# Patient Record
Sex: MALE | Race: WHITE | Employment: UNEMPLOYED | URBAN - NONMETROPOLITAN AREA
[De-identification: names, ages, dates, MRNs, and addresses within clinical notes are randomized per-mention and may not be internally consistent; named-entity substitution may affect disease eponyms.]

---

## 2018-01-28 ENCOUNTER — HOSPITAL ENCOUNTER (EMERGENCY)
Age: 23
Discharge: HOME | End: 2018-01-28
Attending: INTERNAL MEDICINE

## 2018-01-28 ENCOUNTER — APPOINTMENT (OUTPATIENT)
Dept: GENERAL RADIOLOGY | Age: 23
End: 2018-01-28

## 2018-01-28 VITALS
RESPIRATION RATE: 18 BRPM | OXYGEN SATURATION: 98 % | SYSTOLIC BLOOD PRESSURE: 137 MMHG | TEMPERATURE: 98.4 F | DIASTOLIC BLOOD PRESSURE: 73 MMHG | HEART RATE: 105 BPM

## 2018-01-28 DIAGNOSIS — K52.9 GASTROENTERITIS: Primary | ICD-10-CM

## 2018-01-28 LAB
ADENOVIRUS F 40 41 PCR: NOT DETECTED
ALBUMIN SERPL-MCNC: 4.7 G/DL (ref 3.5–5.1)
ALP BLD-CCNC: 61 U/L (ref 38–126)
ALT SERPL-CCNC: 61 U/L (ref 11–66)
ANION GAP SERPL CALCULATED.3IONS-SCNC: 14 MEQ/L (ref 8–16)
AST SERPL-CCNC: 44 U/L (ref 5–40)
ASTROVIRUS PCR: NOT DETECTED
BASOPHILS # BLD: 0.1 %
BASOPHILS ABSOLUTE: 0 THOU/MM3 (ref 0–0.1)
BILIRUB SERPL-MCNC: 0.4 MG/DL (ref 0.3–1.2)
BILIRUBIN DIRECT: < 0.2 MG/DL (ref 0–0.3)
BUN BLDV-MCNC: 14 MG/DL (ref 7–22)
CALCIUM SERPL-MCNC: 9.4 MG/DL (ref 8.5–10.5)
CAMPYLOBACTER PCR: NOT DETECTED
CHLORIDE BLD-SCNC: 98 MEQ/L (ref 98–111)
CLOSTRIDIUM DIFFICILE, PCR: NOT DETECTED
CO2: 28 MEQ/L (ref 23–33)
CREAT SERPL-MCNC: 0.8 MG/DL (ref 0.4–1.2)
CRYPTOSPORIDIUM PCR: NOT DETECTED
CYCLOSPORA CAYETANENSIS PCR: NOT DETECTED
E COLI 0157 PCR: NORMAL
E COLI ENTEROAGGREGATIVE PCR: NOT DETECTED
E COLI ENTEROPATHOGENIC PCR: NOT DETECTED
E COLI ENTEROTOXIGENIC PCR: NOT DETECTED
E COLI SHIGA LIKE TOXIN PCR: NOT DETECTED
E COLI SHIGELLA/ENTEROINVASIVE PCR: NOT DETECTED
E HISTOLYTICA GI FILM ARRAY: NOT DETECTED
EOSINOPHIL # BLD: 0.4 %
EOSINOPHILS ABSOLUTE: 0 THOU/MM3 (ref 0–0.4)
FECAL LEUKOCYTES: NORMAL
FLU A ANTIGEN: NEGATIVE
FLU B ANTIGEN: NEGATIVE
GFR SERPL CREATININE-BSD FRML MDRD: > 90 ML/MIN/1.73M2
GIARDIA LAMBLIA PCR: NOT DETECTED
GLUCOSE BLD-MCNC: 97 MG/DL (ref 70–108)
HCT VFR BLD CALC: 48.4 % (ref 42–52)
HEMOGLOBIN: 16.7 GM/DL (ref 14–18)
LACTIC ACID: 1.2 MMOL/L (ref 0.5–2.2)
LIPASE: 25.6 U/L (ref 5.6–51.3)
LYMPHOCYTES # BLD: 17.5 %
LYMPHOCYTES ABSOLUTE: 1.9 THOU/MM3 (ref 1–4.8)
MCH RBC QN AUTO: 31 PG (ref 27–31)
MCHC RBC AUTO-ENTMCNC: 34.4 GM/DL (ref 33–37)
MCV RBC AUTO: 90.1 FL (ref 80–94)
MONOCYTES # BLD: 6.3 %
MONOCYTES ABSOLUTE: 0.7 THOU/MM3 (ref 0.4–1.3)
NOROVIRUS GI GII PCR: NOT DETECTED
NUCLEATED RED BLOOD CELLS: 0 /100 WBC
OSMOLALITY CALCULATION: 279.8 MOSMOL/KG (ref 275–300)
PDW BLD-RTO: 13 % (ref 11.5–14.5)
PLATELET # BLD: 242 THOU/MM3 (ref 130–400)
PLESIOMONAS SHIGELLOIDES PCR: NOT DETECTED
PMV BLD AUTO: 7.2 MCM (ref 7.4–10.4)
POTASSIUM SERPL-SCNC: 3.8 MEQ/L (ref 3.5–5.2)
RBC # BLD: 5.37 MILL/MM3 (ref 4.7–6.1)
ROTAVIRUS A PCR: NOT DETECTED
SALMONELLA PCR: NOT DETECTED
SAPOVIRUS PCR: NOT DETECTED
SEG NEUTROPHILS: 75.7 %
SEGMENTED NEUTROPHILS ABSOLUTE COUNT: 8.4 THOU/MM3 (ref 1.8–7.7)
SODIUM BLD-SCNC: 140 MEQ/L (ref 135–145)
TOTAL PROTEIN: 7.4 G/DL (ref 6.1–8)
VIBRIO CHOLERAE PCR: NOT DETECTED
VIBRIO PCR: NOT DETECTED
WBC # BLD: 11.1 THOU/MM3 (ref 4.8–10.8)
YERSINIA ENTEROCOLITICA PCR: NOT DETECTED

## 2018-01-28 PROCEDURE — 87804 INFLUENZA ASSAY W/OPTIC: CPT

## 2018-01-28 PROCEDURE — 87507 IADNA-DNA/RNA PROBE TQ 12-25: CPT

## 2018-01-28 PROCEDURE — 89055 LEUKOCYTE ASSESSMENT FECAL: CPT

## 2018-01-28 PROCEDURE — 83690 ASSAY OF LIPASE: CPT

## 2018-01-28 PROCEDURE — 36415 COLL VENOUS BLD VENIPUNCTURE: CPT

## 2018-01-28 PROCEDURE — 82248 BILIRUBIN DIRECT: CPT

## 2018-01-28 PROCEDURE — 85025 COMPLETE CBC W/AUTO DIFF WBC: CPT

## 2018-01-28 PROCEDURE — 6360000002 HC RX W HCPCS: Performed by: INTERNAL MEDICINE

## 2018-01-28 PROCEDURE — 99284 EMERGENCY DEPT VISIT MOD MDM: CPT

## 2018-01-28 PROCEDURE — 74022 RADEX COMPL AQT ABD SERIES: CPT

## 2018-01-28 PROCEDURE — 80053 COMPREHEN METABOLIC PANEL: CPT

## 2018-01-28 PROCEDURE — 83605 ASSAY OF LACTIC ACID: CPT

## 2018-01-28 RX ORDER — ONDANSETRON 4 MG/1
4 TABLET, ORALLY DISINTEGRATING ORAL ONCE
Status: COMPLETED | OUTPATIENT
Start: 2018-01-28 | End: 2018-01-28

## 2018-01-28 RX ORDER — 0.9 % SODIUM CHLORIDE 0.9 %
1000 INTRAVENOUS SOLUTION INTRAVENOUS ONCE
Status: DISCONTINUED | OUTPATIENT
Start: 2018-01-28 | End: 2018-01-28 | Stop reason: HOSPADM

## 2018-01-28 RX ORDER — ONDANSETRON 4 MG/1
4 TABLET, ORALLY DISINTEGRATING ORAL EVERY 8 HOURS PRN
Qty: 20 TABLET | Refills: 0 | Status: SHIPPED | OUTPATIENT
Start: 2018-01-28 | End: 2021-12-07

## 2018-01-28 RX ORDER — ONDANSETRON 2 MG/ML
4 INJECTION INTRAMUSCULAR; INTRAVENOUS ONCE
Status: DISCONTINUED | OUTPATIENT
Start: 2018-01-28 | End: 2018-01-28

## 2018-01-28 RX ADMIN — ONDANSETRON 4 MG: 4 TABLET, ORALLY DISINTEGRATING ORAL at 16:22

## 2018-01-28 ASSESSMENT — ENCOUNTER SYMPTOMS
EYE REDNESS: 0
EYE DISCHARGE: 0
ABDOMINAL PAIN: 0
WHEEZING: 0
DIARRHEA: 1
SORE THROAT: 0
BACK PAIN: 0
SHORTNESS OF BREATH: 0
NAUSEA: 1
RHINORRHEA: 0
COUGH: 1
VOMITING: 1

## 2018-01-28 ASSESSMENT — PAIN SCALES - GENERAL: PAINLEVEL_OUTOF10: 5

## 2018-01-28 ASSESSMENT — PAIN DESCRIPTION - LOCATION: LOCATION: ABDOMEN

## 2018-01-28 ASSESSMENT — PAIN DESCRIPTION - PAIN TYPE: TYPE: ACUTE PAIN

## 2018-01-28 NOTE — ED PROVIDER NOTES
[facility]  eMERGENCY dEPARTMENT eNCOUnter          CHIEF COMPLAINT       Chief Complaint   Patient presents with    Other     possiblel flu       Nurses Notes reviewed and I agree except as noted in the HPI. HISTORY OF PRESENT ILLNESS    Vanessa Guzman is a 25 y.o. male who presents to the Emergency Department for the evaluation of flu like symptoms. He reports that two days ago he started to experience abdominal discomfort, vomiting, nausea, headache, non-reproductive cough, chills, diaphoresis, diarrhea, and decreased appetite. He states to be experiencing normal fluid intake and reports that it is difficult to keep down food. He states that his current symptoms are similar to what he experienced last year when he was diagnosed with gastroenteritis. He denies being around anyone with similar symptoms with the past few days. No additional symptoms or complaints at this time. The HPI was provided by the patient. REVIEW OF SYSTEMS     Review of Systems   Constitutional: Positive for appetite change (decreased), chills and diaphoresis. Negative for fatigue and fever. HENT: Negative for congestion, ear pain, rhinorrhea and sore throat. Eyes: Negative for discharge, redness and visual disturbance. Respiratory: Positive for cough. Negative for shortness of breath and wheezing. Cardiovascular: Negative for chest pain, palpitations and leg swelling. Gastrointestinal: Positive for diarrhea, nausea and vomiting. Negative for abdominal pain (discomfort). Genitourinary: Negative for decreased urine volume, difficulty urinating and dysuria. Musculoskeletal: Negative for arthralgias, back pain, joint swelling and neck pain. Skin: Negative for pallor and rash. Allergic/Immunologic: Negative for environmental allergies. Neurological: Positive for headaches. Negative for dizziness, syncope, weakness and light-headedness. Hematological: Negative for adenopathy. Psychiatric/Behavioral: Negative for agitation, confusion, dysphoric mood and suicidal ideas. The patient is not nervous/anxious. PAST MEDICAL HISTORY    has no past medical history on file. SURGICAL HISTORY      has no past surgical history on file. CURRENT MEDICATIONS       Previous Medications    BUTALBITAL-ACETAMINOPHEN-CAFFEINE (FIORICET) -40 MG PER TABLET    Take 1 tablet by mouth every 4 hours as needed for Headaches    DICYCLOMINE (BENTYL) 10 MG CAPSULE    Take 1 capsule by mouth every 6 hours as needed (cramps)    FAMOTIDINE (PEPCID) 20 MG TABLET    Take 1 tablet by mouth 2 times daily    KETOROLAC (TORADOL) 10 MG TABLET    Take 1 tablet by mouth every 6 hours as needed for Pain    PANTOPRAZOLE (PROTONIX) 40 MG TABLET    Take 1 tablet by mouth daily       ALLERGIES     has No Known Allergies. FAMILY HISTORY     indicated that his mother is alive. He indicated that his father is alive. family history is not on file. SOCIAL HISTORY      reports that he quit smoking about 4 years ago. His smoking use included Cigarettes. He started smoking about 6 years ago. He has never used smokeless tobacco. He reports that he uses drugs, including Marijuana, about 1 time per week. He reports that he does not drink alcohol. PHYSICAL EXAM     INITIAL VITALS:  oral temperature is 98.4 °F (36.9 °C). His blood pressure is 137/73 and his pulse is 105. His respiration is 18 and oxygen saturation is 98%. Physical Exam   Constitutional: He is oriented to person, place, and time. He appears well-developed and well-nourished. HENT:   Head: Normocephalic and atraumatic. Right Ear: External ear normal.   Left Ear: External ear normal.   Mouth/Throat: Uvula is midline, oropharynx is clear and moist and mucous membranes are normal. Mucous membranes are not pale and not dry. No oral lesions. No trismus in the jaw. No dental abscesses, lacerations or dental caries.  No oropharyngeal exudate, posterior

## 2020-02-08 ENCOUNTER — HOSPITAL ENCOUNTER (EMERGENCY)
Age: 25
Discharge: HOME | End: 2020-02-08

## 2020-02-08 VITALS
SYSTOLIC BLOOD PRESSURE: 123 MMHG | RESPIRATION RATE: 18 BRPM | DIASTOLIC BLOOD PRESSURE: 64 MMHG | HEART RATE: 68 BPM | OXYGEN SATURATION: 100 % | TEMPERATURE: 98.8 F

## 2020-02-08 PROCEDURE — 99281 EMR DPT VST MAYX REQ PHY/QHP: CPT

## 2020-02-08 RX ORDER — PENICILLIN V POTASSIUM 500 MG/1
500 TABLET ORAL 4 TIMES DAILY
Qty: 28 TABLET | Refills: 0 | Status: SHIPPED | OUTPATIENT
Start: 2020-02-08 | End: 2020-02-15

## 2020-02-08 ASSESSMENT — ENCOUNTER SYMPTOMS
SINUS PAIN: 0
ABDOMINAL PAIN: 0
COUGH: 0
SHORTNESS OF BREATH: 0
SINUS PRESSURE: 0
ABDOMINAL DISTENTION: 0
NAUSEA: 0
VOMITING: 0
CONSTIPATION: 0
DIARRHEA: 0
EYE PAIN: 0
BLOOD IN STOOL: 0
SORE THROAT: 0

## 2020-02-08 ASSESSMENT — PAIN SCALES - GENERAL: PAINLEVEL_OUTOF10: 10

## 2020-02-08 ASSESSMENT — PAIN DESCRIPTION - PAIN TYPE: TYPE: ACUTE PAIN

## 2020-02-08 ASSESSMENT — PAIN DESCRIPTION - LOCATION: LOCATION: TEETH

## 2020-02-08 ASSESSMENT — PAIN DESCRIPTION - ORIENTATION: ORIENTATION: LEFT

## 2020-02-08 NOTE — ED PROVIDER NOTES
[facility]  eMERGENCY dEPARTMENT eNCOUnter          CHIEF COMPLAINT       Chief Complaint   Patient presents with    Dental Pain     left       Nurses Notes reviewed and I agree except as noted in the HPI. HISTORY OF PRESENT ILLNESS    Ambreen Ross is a 22 y.o. male who presents to the Emergency Department for the evaluation of left upper and lower dental pain. The patient reported he has had the pain for multiple months, but stated the pain has been unbearable for the last couple of days. The patient rated his pain a 10 out of 10 in severity. The patient denied recent fevers. The patient stated the pain generally responds to ibuprofen, but denied any current relief. The patient denied relief from orajel. The patient stated he has a dentist appointment on 2-10-20. The patient is a former smoker. The patient has no further acute complaints at this time. The HPI was provided by the patient. REVIEW OF SYSTEMS     Review of Systems   Constitutional: Negative for chills and fever. HENT: Positive for dental problem. Negative for congestion, ear pain, sinus pressure, sinus pain and sore throat. Eyes: Negative for pain. Respiratory: Negative for cough and shortness of breath. Cardiovascular: Negative for chest pain and leg swelling. Gastrointestinal: Negative for abdominal distention, abdominal pain, blood in stool, constipation, diarrhea, nausea and vomiting. Genitourinary: Negative for difficulty urinating, frequency and hematuria. Musculoskeletal: Negative for arthralgias. Skin: Negative for rash. Neurological: Negative for dizziness and headaches. All other systems reviewed and are negative. PAST MEDICAL HISTORY    has no past medical history on file. SURGICAL HISTORY      has no past surgical history on file.     CURRENT MEDICATIONS       Discharge Medication List as of 2/8/2020 12:51 PM      CONTINUE these medications which have NOT CHANGED    Details MG capsule Take 1 capsule by mouth every 8 hours, Disp-30 capsule, R-0Print             (Please note that portions of this note were completed with a voice recognition program.  Efforts were made to edit the dictations but occasionally words are mis-transcribed.)    The patient was given an opportunity to see the Emergency Attending. The patient voiced understanding that I was a Mid-LevelProvider and was in agreement with being seen independently by myself. Scribe:  Jazmin Tavares 2/8/20 12:51 PM Scribing for and in the presence of [employer], PA-C. Signed by: Lala Jay, 02/08/20 4:49 PM    Provider:  I personally performed the services described in the documentation, reviewed and edited the documentation which was dictated to the scribe in my presence, and it accurately records my words and actions.     Gavin Barba PA-C 2/8/20 4:49 PM      KALEIGH Lopez  02/08/20 1774

## 2020-02-08 NOTE — ED NOTES
Patient presents to the ED with complaints of having left side dental pain. He states that he has an appointment on 2-10-20 but his pain is unbearable.       Ricardo Alexander, PADMININ  70/72/25 9892

## 2020-07-28 ENCOUNTER — APPOINTMENT (OUTPATIENT)
Dept: GENERAL RADIOLOGY | Age: 25
End: 2020-07-28
Payer: MEDICAID

## 2020-07-28 ENCOUNTER — HOSPITAL ENCOUNTER (EMERGENCY)
Age: 25
Discharge: HOME | End: 2020-07-28
Attending: EMERGENCY MEDICINE
Payer: MEDICAID

## 2020-07-28 ENCOUNTER — APPOINTMENT (OUTPATIENT)
Dept: CT IMAGING | Age: 25
End: 2020-07-28
Payer: MEDICAID

## 2020-07-28 VITALS
SYSTOLIC BLOOD PRESSURE: 110 MMHG | WEIGHT: 140 LBS | TEMPERATURE: 97.7 F | RESPIRATION RATE: 16 BRPM | DIASTOLIC BLOOD PRESSURE: 54 MMHG | BODY MASS INDEX: 23.32 KG/M2 | HEIGHT: 65 IN | OXYGEN SATURATION: 98 % | HEART RATE: 75 BPM

## 2020-07-28 LAB
AMPHETAMINE+METHAMPHETAMINE URINE SCREEN: NEGATIVE
ANION GAP SERPL CALCULATED.3IONS-SCNC: 13 MEQ/L (ref 8–16)
BACTERIA: ABNORMAL /HPF
BARBITURATE QUANTITATIVE URINE: NEGATIVE
BASOPHILS # BLD: 0.1 %
BASOPHILS ABSOLUTE: 0 THOU/MM3 (ref 0–0.1)
BENZODIAZEPINE QUANTITATIVE URINE: NEGATIVE
BILIRUBIN URINE: NEGATIVE
BLOOD, URINE: NEGATIVE
BUN BLDV-MCNC: 12 MG/DL (ref 7–22)
CALCIUM SERPL-MCNC: 9 MG/DL (ref 8.5–10.5)
CANNABINOID QUANTITATIVE URINE: POSITIVE
CASTS 2: ABNORMAL /LPF
CASTS UA: ABNORMAL /LPF
CHARACTER, URINE: CLEAR
CHLORIDE BLD-SCNC: 104 MEQ/L (ref 98–111)
CO2: 21 MEQ/L (ref 23–33)
COCAINE METABOLITE QUANTITATIVE URINE: NEGATIVE
COLOR: YELLOW
CREAT SERPL-MCNC: 0.6 MG/DL (ref 0.4–1.2)
CRYPTOCOCCUS NEOFORMANS/GATTI CSF FILM ARR.: NOT DETECTED
CRYSTALS, UA: ABNORMAL
CYTOMEGALOVIRUS (CMV) CSF FILM ARRAY: NOT DETECTED
EKG ATRIAL RATE: 61 BPM
EKG P AXIS: 57 DEGREES
EKG P-R INTERVAL: 150 MS
EKG Q-T INTERVAL: 376 MS
EKG QRS DURATION: 92 MS
EKG QTC CALCULATION (BAZETT): 378 MS
EKG R AXIS: 71 DEGREES
EKG T AXIS: 60 DEGREES
EKG VENTRICULAR RATE: 61 BPM
ENTEROVIRUS DETECTION PCR: NOT DETECTED
EOSINOPHIL # BLD: 0 %
EOSINOPHILS ABSOLUTE: 0 THOU/MM3 (ref 0–0.4)
EPITHELIAL CELLS, UA: ABNORMAL /HPF
ERYTHROCYTE [DISTWIDTH] IN BLOOD BY AUTOMATED COUNT: 12.3 % (ref 11.5–14.5)
ERYTHROCYTE [DISTWIDTH] IN BLOOD BY AUTOMATED COUNT: 40.2 FL (ref 35–45)
ESCHERICHIA COLI K1 CSF FILM ARRAY: NOT DETECTED
GFR SERPL CREATININE-BSD FRML MDRD: > 90 ML/MIN/1.73M2
GLUCOSE BLD-MCNC: 135 MG/DL (ref 70–108)
GLUCOSE URINE: 100 MG/DL
GLUCOSE, CSF: 86 MG/DL (ref 40–80)
HAEMOPHILUS INFLUENZA CSF FILM ARRAY: NOT DETECTED
HCT VFR BLD CALC: 47.9 % (ref 42–52)
HEMOGLOBIN: 17.5 GM/DL (ref 14–18)
HHV-6 (HERPESVIRUS 6) CSF FILM ARRAY: NOT DETECTED
HSV-1 CSF FILM ARRAY: NOT DETECTED
HSV-2 CSF FILM ARRAY: NOT DETECTED
IMMATURE GRANS (ABS): 0.12 THOU/MM3 (ref 0–0.07)
IMMATURE GRANULOCYTES: 0.6 %
KETONES, URINE: ABNORMAL
LEUKOCYTE ESTERASE, URINE: NEGATIVE
LISTERIA MONOCYTOGENES CSF FILM ARRAY: NOT DETECTED
LYMPHOCYTES # BLD: 5.5 %
LYMPHOCYTES ABSOLUTE: 1.1 THOU/MM3 (ref 1–4.8)
MAGNESIUM: 2.3 MG/DL (ref 1.6–2.4)
MCH RBC QN AUTO: 33.1 PG (ref 26–33)
MCHC RBC AUTO-ENTMCNC: 36.5 GM/DL (ref 32.2–35.5)
MCV RBC AUTO: 90.7 FL (ref 80–94)
MISCELLANEOUS 2: ABNORMAL
MONOCYTES # BLD: 7.3 %
MONOCYTES ABSOLUTE: 1.5 THOU/MM3 (ref 0.4–1.3)
NEISSERIA MENIGITIDIS CSF FILM ARRAY: NOT DETECTED
NITRITE, URINE: NEGATIVE
NUCLEATED RED BLOOD CELLS: 0 /100 WBC
OPIATES, URINE: NEGATIVE
OSMOLALITY CALCULATION: 277.5 MOSMOL/KG (ref 275–300)
OXYCODONE: NEGATIVE
PARECHOVIRUS CSF FILM ARRAY: NOT DETECTED
PH UA: 5 (ref 5–9)
PHENCYCLIDINE QUANTITATIVE URINE: NEGATIVE
PLATELET # BLD: 181 THOU/MM3 (ref 130–400)
PMV BLD AUTO: 9.2 FL (ref 9.4–12.4)
POTASSIUM SERPL-SCNC: 3.5 MEQ/L (ref 3.5–5.2)
PROLACTIN: 10.8 NG/ML
PROTEIN CSF: 43 MG/DL (ref 12–60)
PROTEIN UA: 30
RBC # BLD: 5.28 MILL/MM3 (ref 4.7–6.1)
RBC URINE: ABNORMAL /HPF
RENAL EPITHELIAL, UA: ABNORMAL
SEG NEUTROPHILS: 86.5 %
SEGMENTED NEUTROPHILS ABSOLUTE COUNT: 17.6 THOU/MM3 (ref 1.8–7.7)
SODIUM BLD-SCNC: 138 MEQ/L (ref 135–145)
SPECIFIC GRAVITY, URINE: 1.02 (ref 1–1.03)
STREPTOCOCCUS AGALACTIAE CSF FILM ARRAY: NOT DETECTED
STREPTOCOCCUS PNEUMONIAE CSF FILM ARRAY: NOT DETECTED
TROPONIN T: < 0.01 NG/ML
UROBILINOGEN, URINE: 0.2 EU/DL (ref 0–1)
VARICELLA-ZOSTER, PCR: NOT DETECTED
WBC # BLD: 20.4 THOU/MM3 (ref 4.8–10.8)
WBC UA: ABNORMAL /HPF
YEAST: ABNORMAL

## 2020-07-28 PROCEDURE — 36415 COLL VENOUS BLD VENIPUNCTURE: CPT

## 2020-07-28 PROCEDURE — 84146 ASSAY OF PROLACTIN: CPT

## 2020-07-28 PROCEDURE — 93005 ELECTROCARDIOGRAM TRACING: CPT | Performed by: PHYSICIAN ASSISTANT

## 2020-07-28 PROCEDURE — 82945 GLUCOSE OTHER FLUID: CPT

## 2020-07-28 PROCEDURE — 87798 DETECT AGENT NOS DNA AMP: CPT

## 2020-07-28 PROCEDURE — 99284 EMERGENCY DEPT VISIT MOD MDM: CPT

## 2020-07-28 PROCEDURE — 62270 DX LMBR SPI PNXR: CPT

## 2020-07-28 PROCEDURE — 87075 CULTR BACTERIA EXCEPT BLOOD: CPT

## 2020-07-28 PROCEDURE — 84484 ASSAY OF TROPONIN QUANT: CPT

## 2020-07-28 PROCEDURE — 96375 TX/PRO/DX INJ NEW DRUG ADDON: CPT

## 2020-07-28 PROCEDURE — 84157 ASSAY OF PROTEIN OTHER: CPT

## 2020-07-28 PROCEDURE — 71045 X-RAY EXAM CHEST 1 VIEW: CPT

## 2020-07-28 PROCEDURE — 81001 URINALYSIS AUTO W/SCOPE: CPT

## 2020-07-28 PROCEDURE — 70450 CT HEAD/BRAIN W/O DYE: CPT

## 2020-07-28 PROCEDURE — 83735 ASSAY OF MAGNESIUM: CPT

## 2020-07-28 PROCEDURE — 80307 DRUG TEST PRSMV CHEM ANLYZR: CPT

## 2020-07-28 PROCEDURE — 2580000003 HC RX 258: Performed by: PHYSICIAN ASSISTANT

## 2020-07-28 PROCEDURE — 89051 BODY FLUID CELL COUNT: CPT

## 2020-07-28 PROCEDURE — 85025 COMPLETE CBC W/AUTO DIFF WBC: CPT

## 2020-07-28 PROCEDURE — 96374 THER/PROPH/DIAG INJ IV PUSH: CPT

## 2020-07-28 PROCEDURE — 6360000002 HC RX W HCPCS: Performed by: PHYSICIAN ASSISTANT

## 2020-07-28 PROCEDURE — 80048 BASIC METABOLIC PNL TOTAL CA: CPT

## 2020-07-28 PROCEDURE — 87205 SMEAR GRAM STAIN: CPT

## 2020-07-28 RX ORDER — LORAZEPAM 2 MG/ML
1 INJECTION INTRAMUSCULAR ONCE
Status: COMPLETED | OUTPATIENT
Start: 2020-07-28 | End: 2020-07-28

## 2020-07-28 RX ORDER — 0.9 % SODIUM CHLORIDE 0.9 %
1000 INTRAVENOUS SOLUTION INTRAVENOUS ONCE
Status: COMPLETED | OUTPATIENT
Start: 2020-07-28 | End: 2020-07-28

## 2020-07-28 RX ORDER — ONDANSETRON 2 MG/ML
4 INJECTION INTRAMUSCULAR; INTRAVENOUS ONCE
Status: COMPLETED | OUTPATIENT
Start: 2020-07-28 | End: 2020-07-28

## 2020-07-28 RX ORDER — LORAZEPAM 2 MG/ML
INJECTION INTRAMUSCULAR
Status: DISCONTINUED
Start: 2020-07-28 | End: 2020-07-28 | Stop reason: HOSPADM

## 2020-07-28 RX ADMIN — SODIUM CHLORIDE 1000 ML: 9 INJECTION, SOLUTION INTRAVENOUS at 12:43

## 2020-07-28 RX ADMIN — LORAZEPAM 1 MG: 2 INJECTION, SOLUTION INTRAMUSCULAR; INTRAVENOUS at 15:18

## 2020-07-28 RX ADMIN — ONDANSETRON 4 MG: 2 INJECTION INTRAMUSCULAR; INTRAVENOUS at 12:43

## 2020-07-28 ASSESSMENT — ENCOUNTER SYMPTOMS
SORE THROAT: 0
BLOOD IN STOOL: 0
COUGH: 0
CONSTIPATION: 0
SHORTNESS OF BREATH: 0
EYE PAIN: 0
DIARRHEA: 0
SINUS PAIN: 0
NAUSEA: 0
VOMITING: 0
ABDOMINAL DISTENTION: 0
ABDOMINAL PAIN: 0
SINUS PRESSURE: 0

## 2020-07-28 NOTE — ED PROVIDER NOTES
[place] [address] COMPLAINT       Chief Complaint   Patient presents with    Seizures       Nurses Notes reviewed and I agree except as notedin the HPI. HISTORY OF PRESENT ILLNESS    Sujata Miller is a 26 y.o. male who presents was of her friend's house and passed out and shook on the floor for about a minute. He states he did not have any urinary or fecal incontinence. He states he has no prior episodes of this. He states that he does smoke marijuana occasionally. He is not any starting new medications. He denies any nausea, vomiting, or diarrhea. Location/Symptom: Possible seizure/syncope  Timing/Onset: just PTA  Context/Setting: friends house  Quality: none  Duration: 1 minute  Modifying Factors: none  Severity: none    REVIEW OF SYSTEMS     Review of Systems   Constitutional: Negative for chills and fever. HENT: Negative for congestion, ear pain, sinus pressure, sinus pain and sore throat. Eyes: Negative for pain. Respiratory: Negative for cough and shortness of breath. Cardiovascular: Negative for chest pain and leg swelling. Gastrointestinal: Negative for abdominal distention, abdominal pain, blood in stool, constipation, diarrhea, nausea and vomiting. Genitourinary: Negative for difficulty urinating, frequency and hematuria. Musculoskeletal: Negative for arthralgias. Skin: Negative for rash. Neurological: Positive for seizures. Negative for dizziness and headaches. All other systems reviewed and are negative. PAST MEDICAL HISTORY    has no past medical history on file. SURGICAL HISTORY      has no past surgical history on file.     CURRENT MEDICATIONS       Discharge Medication List as of 7/28/2020  7:29 PM      CONTINUE these medications which have NOT CHANGED    Details   etodolac (LODINE) 300 MG capsule Take 1 capsule by mouth every 8 hours, Disp-30 capsule, R-0Print      ondansetron (ZOFRAN ODT) 4 MG disintegrating tablet Take 1 tablet by mouth every 8 hours as needed for Nausea, Disp-20 tablet, R-0Print      butalbital-acetaminophen-caffeine (FIORICET) -40 MG per tablet Take 1 tablet by mouth every 4 hours as needed for Headaches, Disp-30 tablet, R-0      famotidine (PEPCID) 20 MG tablet Take 1 tablet by mouth 2 times daily, Disp-60 tablet, R-2      pantoprazole (PROTONIX) 40 MG tablet Take 1 tablet by mouth daily, Disp-30 tablet, R-3      dicyclomine (BENTYL) 10 MG capsule Take 1 capsule by mouth every 6 hours as needed (cramps), Disp-60 capsule, R-0      ketorolac (TORADOL) 10 MG tablet Take 1 tablet by mouth every 6 hours as needed for Pain, Disp-20 tablet, R-0             ALLERGIES     has No Known Allergies. HISTORY     He indicated that his mother is alive. He indicated that his father is alive. family history is not on file. SOCIALHISTORY      reports that he quit smoking about 7 years ago. His smoking use included cigarettes. He started smoking about 9 years ago. He has never used smokeless tobacco. He reports current drug use. Frequency: 1.00 time per week. Drug: Marijuana. He reports that he does not drink alcohol. PHYSICAL EXAM     INITIAL VITALS:  height is 5' 5\" (1.651 m) and weight is 140 lb (63.5 kg). His oral temperature is 97.7 °F (36.5 °C). His blood pressure is 110/54 (abnormal) and his pulse is 75. His respiration is 16 and oxygen saturation is 98%. Physical Exam  Vitals signs and nursing note reviewed. Constitutional:       Comments: Well Developed Well Nourished Appearing     HENT:      Head: Normocephalic and atraumatic. Right Ear: Tympanic membrane normal.      Left Ear: Tympanic membrane normal.   Eyes:      Pupils: Pupils are equal, round, and reactive to light. Neck:      Musculoskeletal: Normal range of motion and neck supple. Cardiovascular:      Rate and Rhythm: Normal rate and regular rhythm. Heart sounds: Normal heart sounds.    Pulmonary: brain. All CT scans at this facility use dose modulation, iterative reconstruction, and/or weight-based dosing when appropriate to reduce radiation dose to as low as reasonably achievable. FINDINGS:     No acute intracranial findings. Gray-white differentiation is preserved. No acute hemorrhage or midline shift. Ventricles are within normal limits for age. Paranasal sinuses are clear. Mastoid air cells are patent. Skull: Unremarkable. Soft tissues: Unremarkable.                       XR CHEST PORTABLE (Final result)   Result time 07/28/20 12:33:50   Final result by Johana Montejo MD (07/28/20 12:33:50)                 Impression:     Normal mobile chest.         **This report has been created using voice recognition software.  It may contain minor errors which are inherent in voice recognition technology. **     Final report electronically signed by Dr. Johana Montejo on 7/28/2020 12:33 PM             Narrative:     PROCEDURE: XR CHEST PORTABLE     CLINICAL INFORMATION: passed out     COMPARISON: 9/28/2018     TECHNIQUE: A single mobile view of the chest was obtained.                    LABS:   Labs Reviewed   CBC WITH AUTO DIFFERENTIAL - Abnormal; Notable for the following components:       Result Value    WBC 20.4 (*)     MCH 33.1 (*)     MCHC 36.5 (*)     MPV 9.2 (*)     Segs Absolute 17.6 (*)     Monocytes Absolute 1.5 (*)     Immature Grans (Abs) 0.12 (*)     All other components within normal limits   BASIC METABOLIC PANEL - Abnormal; Notable for the following components:    CO2 21 (*)     Glucose 135 (*)     All other components within normal limits   URINE WITH REFLEXED MICRO - Abnormal; Notable for the following components:    Glucose, Ur 100 (*)     Ketones, Urine TRACE (*)     Protein, UA 30 (*)     All other components within normal limits   GLUCOSE, CSF - Abnormal; Notable for the following components:    Glucose, CSF 86 (*)     All other components within normal limits   CULTURE, CSF Narrative:     Source: cerebrospinal fluid       Site: 4 tubes          Current Antibiotics: not stated   MENINGITIS ENCEPHALITIS PANEL CSF, MOLECULAR   TROPONIN   MAGNESIUM   PROLACTIN   URINE DRUG SCREEN   ANION GAP   GLOMERULAR FILTRATION RATE, ESTIMATED   OSMOLALITY   PROTEIN, CSF   CSF CELL COUNT WITH DIFFERENTIAL       EMERGENCY DEPARTMENT COURSE:   :    Vitals:    07/28/20 1400 07/28/20 1621 07/28/20 1801 07/28/20 1920   BP: 123/61 104/60 121/62 (!) 110/54   Pulse: 60 66 66 75   Resp: 16 16 16 16   Temp:       TempSrc:       SpO2: 100% 100% 96% 98%   Weight:       Height:         Patient was seen history physical exam was performed. Patient given IV fluids. Patient given Zofran and Tylenol. Patient is feeling better. The patient did have some photophobia, headache, and a white count that we cannot explain so we made a decision for lumbar puncture all the etiology of the seizure. Case was signed out to my colleague Bird Kay PA-C pending encephalitis panel. See disposition below    CRITICAL CARE:  None    CONSULTS:  None    PROCEDURES:  Patient had a lumbar puncture performed by Dr. Gregoria Adams please see his procedure note. I assisted him with procedure. FINAL IMPRESSION      1.  Seizure-like activity [facility])          DISPOSITION/PLAN   Discharge    PATIENT REFERRED TO:  [address] [facility]  [address] [facility] [address] [phone]  Call in 1 day      Aby Hoff, [address] [phone]      for neurology follow-up    [facility] EMERGENCY DEPT  [address]  [address] [place] [address]  [phone]    If symptoms worsen      DISCHARGE MEDICATIONS:  Discharge Medication List as of 7/28/2020  7:29 PM          (Please note that portions of this note were completed with a voice recognitionprogram.  Efforts were made to edit the dictations but occasionally words are mis-transcribed.)    KALEIGH Vera [place], [place]  07/29/20 8146

## 2020-07-28 NOTE — ED NOTES
Patient resting quietly in cot showing no signs of distress at this time. Denies any pain. Updated on POC. Will continue to monitor. ED nurse-to-nurse bedside report    Chief Complaint   Patient presents with    Seizures      LOC: alert and orientated to name, place, date  Vital signs   Vitals:    07/28/20 1239 07/28/20 1400 07/28/20 1621 07/28/20 1801   BP:  123/61 104/60 121/62   Pulse:  60 66 66   Resp: 16 16 16 16   Temp:       TempSrc:       SpO2: 99% 100% 100% 96%   Weight:       Height:          Pain:    Pain Interventions: none  Pain Goal:   Oxygen: No    Current needs required none   Telemetry: NA  LDAs:   Peripheral IV 07/28/20 Right Wrist (Active)   Site Assessment Clean;Dry; Intact 07/28/20 1919   Line Status Normal saline locked 07/28/20 1919   Dressing Status Clean;Dry; Intact 07/28/20 1919   Dressing Intervention New 07/28/20 1217     Continuous Infusions:   Mobility: Independent  Landrum Fall Risk Score: No flowsheet data found.   Fall Interventions: call light within reach, bed rails up  Report given to: Leroy Carson RN  07/28/20 1920

## 2020-07-28 NOTE — ED NOTES
Patient presents to ED for possible seizure. States he was told by a friend that he fell over and began shaking. Patient denies any history of seizures. Denies any head injury. Denies any pain. Shows no signs of distress at this time. Skin warm and dry. Respirations easy and unlabored.       Jose G Ramirez RN  07/28/20 9907

## 2020-07-28 NOTE — ED PROVIDER NOTES
Patient was signed out to me by Florencia Melchor PA-C at end of shift pending meningitis panel. Patient is a 80-year-old gentleman who came in for concern of new onset seizure. He states that he had eaten some pancakes this morning and 10 minutes later went to his brother's house where he was going to play some video games and was noted to have seizure-like activity with generalized shaking lasting approximately 1 minute associated with eyes rolling back, unresponsiveness, tongue in the back of his throat and some gargling respirations. He was repositioned by his family and regained consciousness and began vomiting. Patient states he does not remember the shaking activity but remembers waking up on the floor and then the vomiting. He has a mild headache with mild photophobia since that time but otherwise denies any complaints. He has no prior history of seizures. He denies any medication or drug use aside from recreational marijuana which is not new for him. He does report some stress from having a new 3month-old baby at home but states he has not felt overwhelmed by this. He is sitting upright, alert, smiling, pleasant without any complaints on my examination. His work-up had revealed 20,000 white blood cell count and urinalysis positive for cannabis but was otherwise unremarkable. Meningitis panel was negative and head CT showed no acute abnormality. Prolactin was within normal limits. Discussed with attending provider. Patient and available family feel comfortable with discharge home and outpatient follow-up. As he is currently uninsured, we will provide referral to the [place] [address] and neurology referral was provided as well. Seizure precautions were discussed and the patient currently has a suspended license and is not driving.   Return precautions were discussed with the patient and family as well and they felt comfortable with the above plan, denying any further needs upon discharge.     1. Seizure-like activity ([facility].)           Renee Handy PA-C  07/28/20 [place] [address] Sera Ragland PA-C  07/28/20 1934

## 2020-07-28 NOTE — ED NOTES
Patient resting quietly in cot showing no signs of distress at this time. Friend remains at bedside. Verbalizes no needs at this time. Will continue to monitor.       Ras Calderon RN  07/28/20 6335

## 2020-07-28 NOTE — ED NOTES
Patient resting quietly in cot showing no signs of distress at this time. Friend at bedside. Complains of a headache but denies need for pain medication. Urine specimen obtained and sent to lab. Will continue to monitor.       Bettina WeavererHUAN  07/28/20 5232

## 2020-07-28 NOTE — ED NOTES
Patient resting quietly in cot showing no signs of distress at this time. Denies any pain. Friend remains at bedside. Updated on POC. Will continue to monitor.       Petra Beltrán RN  07/28/20 3663

## 2020-07-28 NOTE — ED NOTES
Patient resting quietly in cot showing no signs of distress at this time. Emesis x2. Medicated per MAR. Updated on POC. Will continue to monitor.       Jillian Sandoval RN  07/28/20 1873

## 2020-07-29 LAB
CHARACTER, CSF: CLEAR
COLOR CSF: COLORLESS
LYMPHS CSF: 62 % (ref 0–90)
MONOCYTE, CSF: 35 % (ref 0–45)
PATHOLOGIST REVIEW: NORMAL
RBC CSF: 16 /CUMM
SEGS, CSF: 3 % (ref 0–6)
TOTAL NUCLEATED CELLS CSF: 3 /CUMM (ref 0–5)
TUBE VOLUME RECEIVED CSF: 1.5 ML

## 2020-08-02 LAB
ANAEROBIC CULTURE: NORMAL
CSF CULTURE: NORMAL
GRAM STAIN RESULT: NORMAL

## 2020-08-16 ENCOUNTER — HOSPITAL ENCOUNTER (EMERGENCY)
Age: 25
Discharge: HOME | End: 2020-08-16
Payer: COMMERCIAL

## 2020-08-16 VITALS
SYSTOLIC BLOOD PRESSURE: 116 MMHG | TEMPERATURE: 97.3 F | HEART RATE: 59 BPM | WEIGHT: 140 LBS | DIASTOLIC BLOOD PRESSURE: 47 MMHG | OXYGEN SATURATION: 100 % | BODY MASS INDEX: 23.32 KG/M2 | HEIGHT: 65 IN | RESPIRATION RATE: 10 BRPM

## 2020-08-16 LAB
AMPHETAMINE+METHAMPHETAMINE URINE SCREEN: NEGATIVE
ANION GAP SERPL CALCULATED.3IONS-SCNC: 9 MEQ/L (ref 8–16)
BARBITURATE QUANTITATIVE URINE: NEGATIVE
BASOPHILS # BLD: 0.3 %
BASOPHILS ABSOLUTE: 0 THOU/MM3 (ref 0–0.1)
BENZODIAZEPINE QUANTITATIVE URINE: NEGATIVE
BUN BLDV-MCNC: 15 MG/DL (ref 7–22)
CALCIUM SERPL-MCNC: 9 MG/DL (ref 8.5–10.5)
CANNABINOID QUANTITATIVE URINE: POSITIVE
CHLORIDE BLD-SCNC: 103 MEQ/L (ref 98–111)
CO2: 25 MEQ/L (ref 23–33)
COCAINE METABOLITE QUANTITATIVE URINE: NEGATIVE
CREAT SERPL-MCNC: 0.8 MG/DL (ref 0.4–1.2)
EOSINOPHIL # BLD: 0.3 %
EOSINOPHILS ABSOLUTE: 0 THOU/MM3 (ref 0–0.4)
ERYTHROCYTE [DISTWIDTH] IN BLOOD BY AUTOMATED COUNT: 11.9 % (ref 11.5–14.5)
ERYTHROCYTE [DISTWIDTH] IN BLOOD BY AUTOMATED COUNT: 39.6 FL (ref 35–45)
GFR SERPL CREATININE-BSD FRML MDRD: > 90 ML/MIN/1.73M2
GLUCOSE BLD-MCNC: 127 MG/DL (ref 70–108)
HCT VFR BLD CALC: 47.1 % (ref 42–52)
HEMOGLOBIN: 16.8 GM/DL (ref 14–18)
IMMATURE GRANS (ABS): 0.03 THOU/MM3 (ref 0–0.07)
IMMATURE GRANULOCYTES: 0.3 %
LYMPHOCYTES # BLD: 14.8 %
LYMPHOCYTES ABSOLUTE: 1.4 THOU/MM3 (ref 1–4.8)
MAGNESIUM: 2.2 MG/DL (ref 1.6–2.4)
MCH RBC QN AUTO: 32.4 PG (ref 26–33)
MCHC RBC AUTO-ENTMCNC: 35.7 GM/DL (ref 32.2–35.5)
MCV RBC AUTO: 90.9 FL (ref 80–94)
MONOCYTES # BLD: 6.4 %
MONOCYTES ABSOLUTE: 0.6 THOU/MM3 (ref 0.4–1.3)
NUCLEATED RED BLOOD CELLS: 0 /100 WBC
OPIATES, URINE: NEGATIVE
OSMOLALITY CALCULATION: 276.2 MOSMOL/KG (ref 275–300)
OXYCODONE: NEGATIVE
PHENCYCLIDINE QUANTITATIVE URINE: NEGATIVE
PLATELET # BLD: 196 THOU/MM3 (ref 130–400)
PMV BLD AUTO: 9 FL (ref 9.4–12.4)
POTASSIUM SERPL-SCNC: 3.7 MEQ/L (ref 3.5–5.2)
RBC # BLD: 5.18 MILL/MM3 (ref 4.7–6.1)
SEG NEUTROPHILS: 77.9 %
SEGMENTED NEUTROPHILS ABSOLUTE COUNT: 7.4 THOU/MM3 (ref 1.8–7.7)
SODIUM BLD-SCNC: 137 MEQ/L (ref 135–145)
WBC # BLD: 9.5 THOU/MM3 (ref 4.8–10.8)

## 2020-08-16 PROCEDURE — 99284 EMERGENCY DEPT VISIT MOD MDM: CPT

## 2020-08-16 PROCEDURE — 36415 COLL VENOUS BLD VENIPUNCTURE: CPT

## 2020-08-16 PROCEDURE — 80048 BASIC METABOLIC PNL TOTAL CA: CPT

## 2020-08-16 PROCEDURE — 99283 EMERGENCY DEPT VISIT LOW MDM: CPT

## 2020-08-16 PROCEDURE — 99282 EMERGENCY DEPT VISIT SF MDM: CPT

## 2020-08-16 PROCEDURE — 85025 COMPLETE CBC W/AUTO DIFF WBC: CPT

## 2020-08-16 PROCEDURE — 83735 ASSAY OF MAGNESIUM: CPT

## 2020-08-16 PROCEDURE — 80307 DRUG TEST PRSMV CHEM ANLYZR: CPT

## 2020-08-16 RX ORDER — SODIUM CHLORIDE 9 MG/ML
INJECTION, SOLUTION INTRAVENOUS CONTINUOUS
Status: DISCONTINUED | OUTPATIENT
Start: 2020-08-16 | End: 2020-08-16 | Stop reason: HOSPADM

## 2020-08-16 ASSESSMENT — ENCOUNTER SYMPTOMS
EYE PAIN: 0
ABDOMINAL PAIN: 0
DIARRHEA: 0
SINUS PRESSURE: 0
SINUS PAIN: 0
VOMITING: 0
ABDOMINAL DISTENTION: 0
SHORTNESS OF BREATH: 0
COUGH: 0
CONSTIPATION: 0
BLOOD IN STOOL: 0
NAUSEA: 0
SORE THROAT: 0

## 2020-08-16 NOTE — ED PROVIDER NOTES
[facility] 65 22 COMPLAINT       Chief Complaint   Patient presents with    Seizures       Nurses Notes reviewed and I agree except as notedin the HPI. HISTORY OF PRESENT ILLNESS    Antwan Zambrano is a 26 y.o. male who presents was brought in after a seizure occurred at home. Is unknown how long he was seizing. The patient had a seizure prior to this was referred to neurology however never went. He states he was not sure if his Medicaid kicked in or not. his friend that was with him states he was postictal after he was notified of the seizure. No recent illnesses. REVIEW OF SYSTEMS     Review of Systems   Constitutional: Negative for chills and fever. HENT: Negative for congestion, ear pain, sinus pressure, sinus pain and sore throat. Eyes: Negative for pain. Respiratory: Negative for cough and shortness of breath. Cardiovascular: Negative for chest pain and leg swelling. Gastrointestinal: Negative for abdominal distention, abdominal pain, blood in stool, constipation, diarrhea, nausea and vomiting. Genitourinary: Negative for difficulty urinating, frequency and hematuria. Musculoskeletal: Negative for arthralgias. Skin: Negative for rash. Neurological: Positive for seizures. Negative for dizziness and headaches. All other systems reviewed and are negative. PAST MEDICAL HISTORY    has no past medical history on file. SURGICAL HISTORY      has no past surgical history on file.     CURRENT MEDICATIONS       Discharge Medication List as of 8/16/2020  2:28 PM      CONTINUE these medications which have NOT CHANGED    Details   etodolac (LODINE) 300 MG capsule Take 1 capsule by mouth every 8 hours, Disp-30 capsule, R-0Print      ondansetron (ZOFRAN ODT) 4 MG disintegrating tablet Take 1 tablet by mouth every 8 hours as needed for Nausea, Disp-20 tablet, R-0Print      butalbital-acetaminophen-caffeine (FIORICET) -40 MG per tablet Take 1 tablet by mouth every 4 hours as needed for Headaches, Disp-30 tablet, R-0      famotidine (PEPCID) 20 MG tablet Take 1 tablet by mouth 2 times daily, Disp-60 tablet, R-2      pantoprazole (PROTONIX) 40 MG tablet Take 1 tablet by mouth daily, Disp-30 tablet, R-3      dicyclomine (BENTYL) 10 MG capsule Take 1 capsule by mouth every 6 hours as needed (cramps), Disp-60 capsule, R-0      ketorolac (TORADOL) 10 MG tablet Take 1 tablet by mouth every 6 hours as needed for Pain, Disp-20 tablet, R-0             ALLERGIES     has No Known Allergies. HISTORY     He indicated that his mother is alive. He indicated that his father is alive. family history is not on file. SOCIALHISTORY      reports that he quit smoking about 7 years ago. His smoking use included cigarettes. He started smoking about 9 years ago. He has never used smokeless tobacco. He reports current drug use. Frequency: 1.00 time per week. Drug: Marijuana. He reports that he does not drink alcohol. PHYSICAL EXAM     INITIAL VITALS:  height is 5' 5\" (1.651 m) and weight is 140 lb (63.5 kg). His oral temperature is 97.3 °F (36.3 °C). His blood pressure is 116/47 (abnormal) and his pulse is 59. His respiration is 10 and oxygen saturation is 100%. Physical Exam  Vitals signs and nursing note reviewed. Constitutional:       Appearance: He is well-developed. HENT:      Head: Normocephalic and atraumatic. Right Ear: Tympanic membrane and external ear normal.      Left Ear: Tympanic membrane and external ear normal.   Eyes:      Pupils: Pupils are equal, round, and reactive to light. Neck:      Musculoskeletal: Normal range of motion. Cardiovascular:      Rate and Rhythm: Normal rate and regular rhythm. Pulmonary:      Effort: Pulmonary effort is normal.      Breath sounds: Normal breath sounds. No wheezing. Abdominal:      General: Bowel sounds are normal. There is no distension.       Palpations: Abdomen is soft.      Tenderness: There is no abdominal tenderness. Skin:     General: Skin is warm. Neurological:      Mental Status: He is alert and oriented to person, place, and time. Cranial Nerves: No cranial nerve deficit. Coordination: Coordination normal.      Deep Tendon Reflexes: Reflexes normal.   Psychiatric:         Behavior: Behavior normal.         DIFFERENTIAL DIAGNOSIS:   Possible breakthrough seizure. Will get electrolyte panel and urine drug screen to screen for possible etiology. DIAGNOSTIC RESULTS     EKG: All EKG's are interpreted by the Emergency Department Physician who either signs or Co-signs this chart in the absence of a cardiologist.      RADIOLOGY: non-plain film images(s) such as CT, Ultrasound and MRI are read by the radiologist.  None      LABS:   Labs Reviewed   CBC WITH AUTO DIFFERENTIAL - Abnormal; Notable for the following components:       Result Value    MCHC 35.7 (*)     MPV 9.0 (*)     All other components within normal limits   BASIC METABOLIC PANEL - Abnormal; Notable for the following components:    Glucose 127 (*)     All other components within normal limits   MAGNESIUM   URINE DRUG SCREEN   ANION GAP   GLOMERULAR FILTRATION RATE, ESTIMATED   OSMOLALITY       EMERGENCY DEPARTMENT COURSE:   :    Vitals:    08/16/20 1159   BP: (!) 116/47   Pulse: 59   Resp: 10   Temp: 97.3 °F (36.3 °C)   TempSrc: Oral   SpO2: 100%   Weight: 140 lb (63.5 kg)   Height: 5' 5\" (1.651 m)     Patient was seen history physical exam was performed. Patient had no further seizure activity. Will discharge home. See disposition below    CRITICAL CARE:  None    CONSULTS:  None    PROCEDURES:  None    FINAL IMPRESSION      1. Seizure ([address]    2.  Marijuana abuse          DISPOSITION/PLAN   Discharge    PATIENT REFERRED TO:  Kayden Verdin MD  [address] [place] [address],Third Floor [phone]            DISCHARGE MEDICATIONS:  Discharge Medication List as of 8/16/2020  2:28 PM (Please note that portions of this note were completed with a voice recognitionprogram.  Efforts were made to edit the dictations but occasionally words are mis-transcribed.)    KALEIGH Rocha [place], [place]  08/16/20 7578

## 2021-05-02 ENCOUNTER — HOSPITAL ENCOUNTER (EMERGENCY)
Age: 26
Discharge: HOME | End: 2021-05-02
Payer: COMMERCIAL

## 2021-05-02 ENCOUNTER — APPOINTMENT (OUTPATIENT)
Dept: CT IMAGING | Age: 26
End: 2021-05-02
Payer: COMMERCIAL

## 2021-05-02 VITALS
HEART RATE: 85 BPM | WEIGHT: 150 LBS | TEMPERATURE: 97.6 F | SYSTOLIC BLOOD PRESSURE: 102 MMHG | RESPIRATION RATE: 15 BRPM | BODY MASS INDEX: 24.96 KG/M2 | OXYGEN SATURATION: 100 % | DIASTOLIC BLOOD PRESSURE: 46 MMHG

## 2021-05-02 DIAGNOSIS — R56.9 SEIZURE-LIKE ACTIVITY (HCC): Primary | ICD-10-CM

## 2021-05-02 LAB
AMPHETAMINE+METHAMPHETAMINE URINE SCREEN: NEGATIVE
ANION GAP SERPL CALCULATED.3IONS-SCNC: 10 MEQ/L (ref 8–16)
BARBITURATE QUANTITATIVE URINE: NEGATIVE
BASOPHILS # BLD: 0.2 %
BASOPHILS ABSOLUTE: 0 THOU/MM3 (ref 0–0.1)
BENZODIAZEPINE QUANTITATIVE URINE: NEGATIVE
BUN BLDV-MCNC: 11 MG/DL (ref 7–22)
CALCIUM SERPL-MCNC: 8.8 MG/DL (ref 8.5–10.5)
CANNABINOID QUANTITATIVE URINE: POSITIVE
CHLORIDE BLD-SCNC: 109 MEQ/L (ref 98–111)
CO2: 22 MEQ/L (ref 23–33)
COCAINE METABOLITE QUANTITATIVE URINE: NEGATIVE
CREAT SERPL-MCNC: 0.9 MG/DL (ref 0.4–1.2)
EKG ATRIAL RATE: 69 BPM
EKG P AXIS: 59 DEGREES
EKG P-R INTERVAL: 136 MS
EKG Q-T INTERVAL: 370 MS
EKG QRS DURATION: 86 MS
EKG QTC CALCULATION (BAZETT): 396 MS
EKG R AXIS: 72 DEGREES
EKG T AXIS: 57 DEGREES
EKG VENTRICULAR RATE: 69 BPM
EOSINOPHIL # BLD: 0.4 %
EOSINOPHILS ABSOLUTE: 0 THOU/MM3 (ref 0–0.4)
ERYTHROCYTE [DISTWIDTH] IN BLOOD BY AUTOMATED COUNT: 11.8 % (ref 11.5–14.5)
ERYTHROCYTE [DISTWIDTH] IN BLOOD BY AUTOMATED COUNT: 38.7 FL (ref 35–45)
GFR SERPL CREATININE-BSD FRML MDRD: > 90 ML/MIN/1.73M2
GLUCOSE BLD-MCNC: 177 MG/DL (ref 70–108)
HCT VFR BLD CALC: 47.9 % (ref 42–52)
HEMOGLOBIN: 16.6 GM/DL (ref 14–18)
IMMATURE GRANS (ABS): 0.02 THOU/MM3 (ref 0–0.07)
IMMATURE GRANULOCYTES: 0.2 %
LYMPHOCYTES # BLD: 13.7 %
LYMPHOCYTES ABSOLUTE: 1.1 THOU/MM3 (ref 1–4.8)
MAGNESIUM: 2.1 MG/DL (ref 1.6–2.4)
MCH RBC QN AUTO: 31.3 PG (ref 26–33)
MCHC RBC AUTO-ENTMCNC: 34.7 GM/DL (ref 32.2–35.5)
MCV RBC AUTO: 90.4 FL (ref 80–94)
MONOCYTES # BLD: 7.1 %
MONOCYTES ABSOLUTE: 0.6 THOU/MM3 (ref 0.4–1.3)
NUCLEATED RED BLOOD CELLS: 0 /100 WBC
OPIATES, URINE: NEGATIVE
OSMOLALITY CALCULATION: 285 MOSMOL/KG (ref 275–300)
OXYCODONE: NEGATIVE
PHENCYCLIDINE QUANTITATIVE URINE: NEGATIVE
PLATELET # BLD: 168 THOU/MM3 (ref 130–400)
PMV BLD AUTO: 9.2 FL (ref 9.4–12.4)
POTASSIUM SERPL-SCNC: 4 MEQ/L (ref 3.5–5.2)
RBC # BLD: 5.3 MILL/MM3 (ref 4.7–6.1)
SEG NEUTROPHILS: 78.4 %
SEGMENTED NEUTROPHILS ABSOLUTE COUNT: 6.5 THOU/MM3 (ref 1.8–7.7)
SODIUM BLD-SCNC: 141 MEQ/L (ref 135–145)
TROPONIN T: < 0.01 NG/ML
WBC # BLD: 8.3 THOU/MM3 (ref 4.8–10.8)

## 2021-05-02 PROCEDURE — 84484 ASSAY OF TROPONIN QUANT: CPT

## 2021-05-02 PROCEDURE — 36415 COLL VENOUS BLD VENIPUNCTURE: CPT

## 2021-05-02 PROCEDURE — 80307 DRUG TEST PRSMV CHEM ANLYZR: CPT

## 2021-05-02 PROCEDURE — 70450 CT HEAD/BRAIN W/O DYE: CPT

## 2021-05-02 PROCEDURE — 93005 ELECTROCARDIOGRAM TRACING: CPT | Performed by: PHYSICIAN ASSISTANT

## 2021-05-02 PROCEDURE — 6360000002 HC RX W HCPCS: Performed by: PHYSICIAN ASSISTANT

## 2021-05-02 PROCEDURE — 83735 ASSAY OF MAGNESIUM: CPT

## 2021-05-02 PROCEDURE — 96374 THER/PROPH/DIAG INJ IV PUSH: CPT

## 2021-05-02 PROCEDURE — 99284 EMERGENCY DEPT VISIT MOD MDM: CPT

## 2021-05-02 PROCEDURE — 80048 BASIC METABOLIC PNL TOTAL CA: CPT

## 2021-05-02 PROCEDURE — 85025 COMPLETE CBC W/AUTO DIFF WBC: CPT

## 2021-05-02 PROCEDURE — 6370000000 HC RX 637 (ALT 250 FOR IP): Performed by: PHYSICIAN ASSISTANT

## 2021-05-02 RX ORDER — ACETAMINOPHEN 500 MG
1000 TABLET ORAL ONCE
Status: COMPLETED | OUTPATIENT
Start: 2021-05-02 | End: 2021-05-02

## 2021-05-02 RX ORDER — ONDANSETRON 2 MG/ML
4 INJECTION INTRAMUSCULAR; INTRAVENOUS ONCE
Status: COMPLETED | OUTPATIENT
Start: 2021-05-02 | End: 2021-05-02

## 2021-05-02 RX ADMIN — ONDANSETRON 4 MG: 2 INJECTION INTRAMUSCULAR; INTRAVENOUS at 10:17

## 2021-05-02 RX ADMIN — ACETAMINOPHEN 1000 MG: 500 TABLET ORAL at 10:17

## 2021-05-02 ASSESSMENT — PAIN DESCRIPTION - DESCRIPTORS: DESCRIPTORS: ACHING

## 2021-05-02 ASSESSMENT — ENCOUNTER SYMPTOMS
VOMITING: 0
SORE THROAT: 0
NAUSEA: 0
CONSTIPATION: 0
ABDOMINAL PAIN: 0
ABDOMINAL DISTENTION: 0
SINUS PAIN: 0
SINUS PRESSURE: 0
EYE PAIN: 0
DIARRHEA: 0
COUGH: 0
SHORTNESS OF BREATH: 0
BLOOD IN STOOL: 0

## 2021-05-02 ASSESSMENT — PAIN SCALES - GENERAL: PAINLEVEL_OUTOF10: 4

## 2021-05-02 ASSESSMENT — PAIN DESCRIPTION - LOCATION: LOCATION: HEAD

## 2021-05-02 ASSESSMENT — PAIN DESCRIPTION - ONSET: ONSET: SUDDEN

## 2021-05-02 NOTE — ED NOTES
Bed: 029A  Expected date:   Expected time:   Means of arrival: [facility]  Comments:     Johnson Griffiths RN  05/02/21 0413

## 2021-05-02 NOTE — LETTER
[facility] [address] [address] EMERGENCY DEPT  [address] [place] [address]  Phone: [phone]               May 2, 2021    Patient: Kobe Zhu   YOB: 1995   Date of Visit: 5/2/2021       To Whom It May Concern:    Ravindra Swann was seen and treated in our emergency department on 5/2/2021. He may return to work on 5/3/2021.       Sincerely,       Gio Flowers RN        Signature:__________________________________

## 2021-05-02 NOTE — ED NOTES
Patient to the ED via EMS for evaluation of a seizure. Patient was driving today on his way to work when he states that he believes he had a seizure. Patient states that he thinks this happened at the [facility] roundabout. \" He states that he has had seizures in the past but has not followed with neurologist due to cost. Patient is resting in bed with easy and unlabored respirations. Call light in reach. Side rails up x2. Seizure pads in place. Patient denies further complaints or concerns. Will monitor. [facility], patient was found behind [place], male in black pontiac G6 stating that he needs help for his brother in a box. There is a shoe box in the car and also a gun box in the back window. Patient is walking to [facility] road. Vehicle was parked on curb on [facility].       Ragini Garcia RN  05/02/21 4631

## 2021-05-02 NOTE — ED PROVIDER NOTES
tablet Take 1 tablet by mouth every 8 hours as needed for Nausea, Disp-20 tablet, R-0Print      butalbital-acetaminophen-caffeine (FIORICET) -40 MG per tablet Take 1 tablet by mouth every 4 hours as needed for Headaches, Disp-30 tablet, R-0      famotidine (PEPCID) 20 MG tablet Take 1 tablet by mouth 2 times daily, Disp-60 tablet, R-2      pantoprazole (PROTONIX) 40 MG tablet Take 1 tablet by mouth daily, Disp-30 tablet, R-3      dicyclomine (BENTYL) 10 MG capsule Take 1 capsule by mouth every 6 hours as needed (cramps), Disp-60 capsule, R-0      ketorolac (TORADOL) 10 MG tablet Take 1 tablet by mouth every 6 hours as needed for Pain, Disp-20 tablet, R-0             ALLERGIES     has No Known Allergies. HISTORY     He indicated that his mother is alive. He indicated that his father is alive. family history is not on file. SOCIALHISTORY      reports that he quit smoking about 8 years ago. His smoking use included cigarettes. He started smoking about 10 years ago. He has never used smokeless tobacco. He reports current drug use. Frequency: 1.00 time per week. Drug: Marijuana. He reports that he does not drink alcohol. PHYSICAL EXAM     INITIAL VITALS:  weight is 150 lb (68 kg). His temperature is 97.6 °F (36.4 °C). His blood pressure is 102/46 (abnormal) and his pulse is 85. His respiration is 15 and oxygen saturation is 100%. Physical Exam  Vitals signs and nursing note reviewed. Constitutional:       Comments: Well Developed Well Nourished Appearing     HENT:      Head: Normocephalic and atraumatic. Right Ear: Tympanic membrane normal.      Left Ear: Tympanic membrane normal.   Eyes:      Pupils: Pupils are equal, round, and reactive to light. Neck:      Musculoskeletal: Normal range of motion and neck supple. Cardiovascular:      Rate and Rhythm: Normal rate and regular rhythm. Heart sounds: Normal heart sounds.    Pulmonary:      Effort: Pulmonary effort is normal. No respiratory distress. Breath sounds: Normal breath sounds. No wheezing. Abdominal:      General: Bowel sounds are normal. There is no distension. Palpations: Abdomen is soft. Neurological:      General: No focal deficit present. Mental Status: He is oriented to person, place, and time. Mental status is at baseline. DIFFERENTIAL DIAGNOSIS:   Possible breakthrough seizure. Possible syncope. DIAGNOSTIC RESULTS     EKG: All EKG's are interpreted by the Emergency Department Physician who either signs or Co-signs this chart in the absence of a cardiologist.      RADIOLOGY: non-plain film images(s) such as CT, Ultrasound and MRI are read by the radiologist.  CT scan of the head without contrast read per radiology      LABS:   Labs Reviewed   CBC WITH AUTO DIFFERENTIAL - Abnormal; Notable for the following components:       Result Value    MPV 9.2 (*)     All other components within normal limits   BASIC METABOLIC PANEL - Abnormal; Notable for the following components:    CO2 22 (*)     Glucose 177 (*)     All other components within normal limits   MAGNESIUM   TROPONIN   URINE DRUG SCREEN   ANION GAP   GLOMERULAR FILTRATION RATE, ESTIMATED   OSMOLALITY       EMERGENCY DEPARTMENT COURSE:   :    Vitals:    05/02/21 0954   BP: (!) 102/46   Pulse: 85   Resp: 15   Temp: 97.6 °F (36.4 °C)   SpO2: 100%   Weight: 150 lb (68 kg)     Patient was seen history physical exam was performed. See disposition below    CRITICAL CARE:  None    CONSULTS:  None    PROCEDURES:  None    FINAL IMPRESSION      1.  Seizure-like activity [facility])          DISPOSITION/PLAN   Discharge    PATIENT REFERRED TO:  Keny Alarcon MD  [address] [place] [address] [address] [address]  [phone]    In 2 days  No driving until cleared by neurology    Bebo Singh MD  [address] [place] [address],Third Floor [phone]    In 2 days        DISCHARGE MEDICATIONS:  Discharge Medication List as of 5/2/2021 11:20 AM          (Please note that portions of this note were completed with a voice recognitionprogram.  Efforts were made to edit the dictations but occasionally words are mis-transcribed.)    KALEIGH Duque           [place], [place]  05/02/21 1958

## 2021-11-02 ENCOUNTER — HOSPITAL ENCOUNTER (EMERGENCY)
Age: 26
Discharge: HOME | End: 2021-11-02
Attending: FAMILY MEDICINE
Payer: COMMERCIAL

## 2021-11-02 VITALS
HEIGHT: 65 IN | DIASTOLIC BLOOD PRESSURE: 46 MMHG | RESPIRATION RATE: 20 BRPM | TEMPERATURE: 97.3 F | WEIGHT: 150 LBS | OXYGEN SATURATION: 100 % | BODY MASS INDEX: 24.99 KG/M2 | SYSTOLIC BLOOD PRESSURE: 101 MMHG | HEART RATE: 59 BPM

## 2021-11-02 DIAGNOSIS — F44.5 PSEUDOSEIZURE: ICD-10-CM

## 2021-11-02 DIAGNOSIS — G40.909 SEIZURE DISORDER (HCC): Primary | ICD-10-CM

## 2021-11-02 LAB
ANION GAP SERPL CALCULATED.3IONS-SCNC: 11 MEQ/L (ref 8–16)
BASOPHILS # BLD: 0.3 %
BASOPHILS ABSOLUTE: 0 THOU/MM3 (ref 0–0.1)
BUN BLDV-MCNC: 14 MG/DL (ref 7–22)
CALCIUM SERPL-MCNC: 9.3 MG/DL (ref 8.5–10.5)
CHLORIDE BLD-SCNC: 106 MEQ/L (ref 98–111)
CO2: 23 MEQ/L (ref 23–33)
CREAT SERPL-MCNC: 0.7 MG/DL (ref 0.4–1.2)
EKG ATRIAL RATE: 72 BPM
EKG P AXIS: 66 DEGREES
EKG P-R INTERVAL: 138 MS
EKG Q-T INTERVAL: 352 MS
EKG QRS DURATION: 86 MS
EKG QTC CALCULATION (BAZETT): 385 MS
EKG R AXIS: 83 DEGREES
EKG T AXIS: 76 DEGREES
EKG VENTRICULAR RATE: 72 BPM
EOSINOPHIL # BLD: 0.3 %
EOSINOPHILS ABSOLUTE: 0 THOU/MM3 (ref 0–0.4)
ERYTHROCYTE [DISTWIDTH] IN BLOOD BY AUTOMATED COUNT: 11.9 % (ref 11.5–14.5)
ERYTHROCYTE [DISTWIDTH] IN BLOOD BY AUTOMATED COUNT: 38.5 FL (ref 35–45)
GFR SERPL CREATININE-BSD FRML MDRD: > 90 ML/MIN/1.73M2
GLUCOSE BLD-MCNC: 115 MG/DL (ref 70–108)
GLUCOSE BLD-MCNC: 116 MG/DL (ref 70–108)
HCT VFR BLD CALC: 47.8 % (ref 42–52)
HEMOGLOBIN: 17.1 GM/DL (ref 14–18)
IMMATURE GRANS (ABS): 0.06 THOU/MM3 (ref 0–0.07)
IMMATURE GRANULOCYTES: 0.5 %
LACTIC ACID: 1.9 MMOL/L (ref 0.5–2)
LYMPHOCYTES # BLD: 12.1 %
LYMPHOCYTES ABSOLUTE: 1.4 THOU/MM3 (ref 1–4.8)
MCH RBC QN AUTO: 31.9 PG (ref 26–33)
MCHC RBC AUTO-ENTMCNC: 35.8 GM/DL (ref 32.2–35.5)
MCV RBC AUTO: 89.2 FL (ref 80–94)
MONOCYTES # BLD: 6.4 %
MONOCYTES ABSOLUTE: 0.7 THOU/MM3 (ref 0.4–1.3)
NUCLEATED RED BLOOD CELLS: 0 /100 WBC
PLATELET # BLD: 214 THOU/MM3 (ref 130–400)
PMV BLD AUTO: 8.7 FL (ref 9.4–12.4)
POTASSIUM REFLEX MAGNESIUM: 4.9 MEQ/L (ref 3.5–5.2)
RBC # BLD: 5.36 MILL/MM3 (ref 4.7–6.1)
SEG NEUTROPHILS: 80.4 %
SEGMENTED NEUTROPHILS ABSOLUTE COUNT: 9.4 THOU/MM3 (ref 1.8–7.7)
SODIUM BLD-SCNC: 140 MEQ/L (ref 135–145)
TOTAL CK: 100 U/L (ref 55–170)
WBC # BLD: 11.7 THOU/MM3 (ref 4.8–10.8)

## 2021-11-02 PROCEDURE — 85025 COMPLETE CBC W/AUTO DIFF WBC: CPT

## 2021-11-02 PROCEDURE — 93005 ELECTROCARDIOGRAM TRACING: CPT | Performed by: STUDENT IN AN ORGANIZED HEALTH CARE EDUCATION/TRAINING PROGRAM

## 2021-11-02 PROCEDURE — 96375 TX/PRO/DX INJ NEW DRUG ADDON: CPT

## 2021-11-02 PROCEDURE — 80048 BASIC METABOLIC PNL TOTAL CA: CPT

## 2021-11-02 PROCEDURE — 99283 EMERGENCY DEPT VISIT LOW MDM: CPT

## 2021-11-02 PROCEDURE — 82550 ASSAY OF CK (CPK): CPT

## 2021-11-02 PROCEDURE — 96374 THER/PROPH/DIAG INJ IV PUSH: CPT

## 2021-11-02 PROCEDURE — 83605 ASSAY OF LACTIC ACID: CPT

## 2021-11-02 PROCEDURE — 36415 COLL VENOUS BLD VENIPUNCTURE: CPT

## 2021-11-02 PROCEDURE — 82948 REAGENT STRIP/BLOOD GLUCOSE: CPT

## 2021-11-02 PROCEDURE — 6360000002 HC RX W HCPCS: Performed by: STUDENT IN AN ORGANIZED HEALTH CARE EDUCATION/TRAINING PROGRAM

## 2021-11-02 RX ORDER — KETOROLAC TROMETHAMINE 30 MG/ML
30 INJECTION, SOLUTION INTRAMUSCULAR; INTRAVENOUS ONCE
Status: COMPLETED | OUTPATIENT
Start: 2021-11-02 | End: 2021-11-02

## 2021-11-02 RX ORDER — ONDANSETRON 2 MG/ML
4 INJECTION INTRAMUSCULAR; INTRAVENOUS ONCE
Status: COMPLETED | OUTPATIENT
Start: 2021-11-02 | End: 2021-11-02

## 2021-11-02 RX ADMIN — KETOROLAC TROMETHAMINE 30 MG: 30 INJECTION, SOLUTION INTRAMUSCULAR; INTRAVENOUS at 09:47

## 2021-11-02 RX ADMIN — ONDANSETRON 4 MG: 2 INJECTION INTRAMUSCULAR; INTRAVENOUS at 09:47

## 2021-11-02 ASSESSMENT — PAIN DESCRIPTION - LOCATION: LOCATION: HEAD

## 2021-11-02 ASSESSMENT — PAIN SCALES - GENERAL
PAINLEVEL_OUTOF10: 7
PAINLEVEL_OUTOF10: 7

## 2021-11-02 ASSESSMENT — PAIN DESCRIPTION - PAIN TYPE: TYPE: ACUTE PAIN

## 2021-11-02 ASSESSMENT — ENCOUNTER SYMPTOMS
GASTROINTESTINAL NEGATIVE: 1
RESPIRATORY NEGATIVE: 1

## 2021-11-02 ASSESSMENT — PAIN DESCRIPTION - DESCRIPTORS: DESCRIPTORS: ACHING

## 2021-11-02 NOTE — ED NOTES
Patient presents to the ed with c/o seizure that was unwitnessed, pt girlfriend called EMS. Pt brought to ed by [facility]. PT is a&o upon arrival. PT walked to the cot. PT complaining of 7/10 pain in the head and nausea. Pt states that he has a hx of seizures but no medication prescribed and does not follow up with neuro. Pt states that his last seizure was 2 months ago. PT denies knowing what happened or that he was going to have a seizure. Side rails x2. Call light within reach.       Alveria Alpers, [place]  22/16/68 2381

## 2021-11-02 NOTE — ED NOTES
Blood glucose 115. EKG completed. Seizure precautions placed.       Carlos Peterson [place]  [birth year] 2651

## 2021-11-02 NOTE — ED NOTES
Bed: 008A  Expected date:   Expected time:   Means of arrival: [facility]  Comments:     Harpal Muniz RN  11/02/21 8624

## 2021-11-02 NOTE — ED PROVIDER NOTES
[facility] ENCOUNTER          Pt Name: Caleb Guerra  MRN: [id number]  [place] 1995  Date of evaluation: 11/2/2021  Treating Resident Physician: Tio Baugh MD  Supervising Physician: Deborah Omer MD.    [address] [place] [address]       Chief Complaint   Patient presents with    Seizures    Headache     History obtained from the patient. HISTORY OF PRESENT ILLNESS    HPI  Caelb Guerra is a 23 y.o. male who presents to the emergency department for evaluation of seizure. Patient refers that 3 hours ago he had a seizure. Patient started feeling of, nausea episode of emesis with posterior generalized shaking , but this episode patient refers feeling fatigue and weakness. Patient has had the same episodes before since 2 years ago requiring multiple ED attentions, previous CT head scan normal lumbar puncture normal as well has blood work. Patient does not remember that has been referred to a neurologist.  Currently patient complaining of nausea and frontal mild headache with no other symptoms  The patient has no other acute complaints at this time. REVIEW OF SYSTEMS   Review of Systems   Constitutional: Negative. HENT: Negative. Respiratory: Negative. Cardiovascular: Negative. Gastrointestinal: Negative. Endocrine: Negative. Genitourinary: Negative. Musculoskeletal: Negative. Neurological: Negative. Psychiatric/Behavioral: Negative. PAST MEDICAL AND SURGICAL HISTORY   No past medical history on file. No past surgical history on file. MEDICATIONS   No current facility-administered medications for this encounter.     Current Outpatient Medications:     etodolac (LODINE) 300 MG capsule, Take 1 capsule by mouth every 8 hours, Disp: 30 capsule, Rfl: 0    ondansetron (ZOFRAN ODT) 4 MG disintegrating tablet, Take 1 tablet by mouth every 8 hours as needed for Nausea, Disp: 20 tablet, Rfl: 0   butalbital-acetaminophen-caffeine (FIORICET) -40 MG per tablet, Take 1 tablet by mouth every 4 hours as needed for Headaches, Disp: 30 tablet, Rfl: 0    famotidine (PEPCID) 20 MG tablet, Take 1 tablet by mouth 2 times daily, Disp: 60 tablet, Rfl: 2    pantoprazole (PROTONIX) 40 MG tablet, Take 1 tablet by mouth daily, Disp: 30 tablet, Rfl: 3    dicyclomine (BENTYL) 10 MG capsule, Take 1 capsule by mouth every 6 hours as needed (cramps), Disp: 60 capsule, Rfl: 0    ketorolac (TORADOL) 10 MG tablet, Take 1 tablet by mouth every 6 hours as needed for Pain, Disp: 20 tablet, Rfl: 0      SOCIAL HISTORY     Social History     Social History Narrative    Not on file     Social History     Tobacco Use    Smoking status: Former Smoker     Types: Cigarettes     Start date: 2011     Quit date: 2013     Years since quittin.6    Smokeless tobacco: Never Used   Substance Use Topics    Alcohol use: No     Alcohol/week: 0.0 standard drinks    Drug use: Yes     Frequency: 1.0 times per week     Types: Marijuana (Weed)         ALLERGIES   No Known Allergies      FAMILY HISTORY   No family history on file. PREVIOUS RECORDS   Previous records reviewed: No taking any medication currently, no recent trauma, no recent surgery, no new allergies, patient refers THC consumption. Mariely Randolph PHYSICAL EXAM     ED Triage Vitals [21 0742]   BP Temp Temp Source Pulse Resp SpO2 Height Weight   (!) 97/46 97.3 °F (36.3 °C) Oral 78 14 100 % 5' 5\" (1.651 m) 150 lb (68 kg)     Initial vital signs and nursing assessment reviewed and normal. Body mass index is 24.96 kg/m². Pulsoximetry is normal per my interpretation. Additional Vital Signs:  Vitals:    21 1042   BP: (!) 101/46   Pulse:    Resp: 20   Temp: (!) 59 °F (15 °C)   SpO2: 100%       Physical Exam  Constitutional:       Appearance: Normal appearance. HENT:      Head: Normocephalic and atraumatic.       Nose: Nose normal.      Mouth/Throat:      Mouth: Mucous membranes are moist.   Eyes:      Extraocular Movements: Extraocular movements intact. Pupils: Pupils are equal, round, and reactive to light. Cardiovascular:      Rate and Rhythm: Normal rate and regular rhythm. Pulses: Normal pulses. Heart sounds: Normal heart sounds. Pulmonary:      Effort: Pulmonary effort is normal.      Breath sounds: Normal breath sounds. Abdominal:      General: Abdomen is flat. Palpations: Abdomen is soft. Musculoskeletal:         General: Normal range of motion. Cervical back: Normal range of motion and neck supple. Skin:     General: Skin is warm. Capillary Refill: Capillary refill takes less than 2 seconds. Neurological:      General: No focal deficit present. Mental Status: He is alert and oriented to person, place, and time. Cranial Nerves: No cranial nerve deficit. Sensory: No sensory deficit. Motor: No weakness. Coordination: Coordination normal.   Psychiatric:         Mood and Affect: Mood normal.             MEDICAL DECISION MAKING   Initial Assessment:   3 23year-old patient complaining of seizure-like activity, previous history of same episodes already assessed in the emergency department with brain imaging as well has lumbar puncture and blood work. Physical examination currently patient is not in postictal with no motor or sensory focalization. Neurological observation will be carried out, blood work, probable discharge with neurology follow-up. 2. Clinical impression  a. Pseudoseizure  Plan:   Zofran, Toradol  CBC, BMP, CK, lactic acid  Neurological observation  Assessment.         ED RESULTS   Laboratory results:  Labs Reviewed   CBC WITH AUTO DIFFERENTIAL - Abnormal; Notable for the following components:       Result Value    WBC 11.7 (*)     MCHC 35.8 (*)     MPV 8.7 (*)     Segs Absolute 9.4 (*)     All other components within normal limits   BASIC METABOLIC PANEL W/ REFLEX TO MG FOR LOW K - Abnormal; Notable for the following components:    Glucose 116 (*)     All other components within normal limits   POCT GLUCOSE - Abnormal; Notable for the following components:    POC Glucose 115 (*)     All other components within normal limits   CK   ANION GAP   GLOMERULAR FILTRATION RATE, ESTIMATED   LACTIC ACID, PLASMA       Radiologic studies results:  No orders to display       ED Medications administered this visit:   Medications   ketorolac (TORADOL) injection 30 mg (30 mg IntraVENous Given 11/2/21 0947)   ondansetron (ZOFRAN) injection 4 mg (4 mg IntraVENous Given 11/2/21 0947)         ED COURSE     ED Course as of Nov 02 1109   Tue Nov 02, 2021   1022 Normal   Lactic Acid, Plasma:    Lactic Acid 1.9 [JR]   1022 Normal   CK: Total  [JR]   1022 Normal   Basic Metabolic Panel w/ Reflex to MG(!):    Sodium 140   Potassium 4.9   Chloride 106   CO2 23   Glucose 116(!)   BUN 14   Creatinine 0.7   Calcium 9.3 [JR]   1022 Mild leukocytosis   CBC Auto Differential(!):    WBC 11.7(!)   RBC 5.36   Hemoglobin Quant 17.1   Hematocrit 47.8   MCV 89.2   MCH 31.9   MCHC 35.8(!)   RDW-CV 11.9   RDW-SD 38.5   Platelet Count 431   MPV 8.7(!)   Seg Neutrophils 80.4   Lymphocytes 12.1   Monocytes 6.4   Eosinophils 0.3   Basophils 0.3   Immature Granulocytes 0.5   Segs Absolute 9.4(!)   Lymphocytes Absolute 1.4   Monocytes Absolute 0.7   Eosinophils Absolute 0.0   Basophils Absolute 0.0   Immature Grans (Abs) 0.06   Nucleated Red Blood Cells 0 [JR]      ED Course User Index  [JR] Jenn Bedolla MD     Reassessment  23year-old patient patient diagnosis of pseudoseizure, laboratory tests came back showing normal lactic acid, no CK elevation, mild leukocytosis, neurologic observation in the ED satisfactory patient with no new episodes of shaking. Patient will be discharged with instruction of follow-up with neurology as well has primary care physician.   I have highlighted the importance of getting an appointment with neurology for which patient understands and agrees. Alarm signs and recommendations were given. Strict return precautions and follow up instructions were discussed with the patient prior to discharge, with which the patient agrees. MEDICATION CHANGES     New Prescriptions    No medications on file         FINAL DISPOSITION     Final diagnoses:   Seizure disorder ([facility] [address]   Pseudoseizure     Condition: condition: good  Dispo: Discharge to home      This transcription was electronically signed. Parts of this transcriptions may have been dictated by use of voice recognition software and electronically transcribed, and parts may have been transcribed with the assistance of an ED scribe. The transcription may contain errors not detected in proofreading. Please refer to my supervising physician's documentation if my documentation differs.     Electronically Signed: Adilene Pena MD, 11/02/21, 11:09 AM       Adilene Pena MD  Resident  11/02/21 2300

## 2021-12-07 ENCOUNTER — INITIAL CONSULT (OUTPATIENT)
Dept: NEUROLOGY | Age: 26
End: 2021-12-07
Payer: COMMERCIAL

## 2021-12-07 VITALS
HEIGHT: 65 IN | WEIGHT: 171 LBS | DIASTOLIC BLOOD PRESSURE: 70 MMHG | OXYGEN SATURATION: 99 % | HEART RATE: 79 BPM | SYSTOLIC BLOOD PRESSURE: 126 MMHG | BODY MASS INDEX: 28.49 KG/M2

## 2021-12-07 DIAGNOSIS — R55 SYNCOPE AND COLLAPSE: Primary | ICD-10-CM

## 2021-12-07 DIAGNOSIS — R56.9 SEIZURE-LIKE ACTIVITY (HCC): ICD-10-CM

## 2021-12-07 PROCEDURE — G8484 FLU IMMUNIZE NO ADMIN: HCPCS | Performed by: PSYCHIATRY & NEUROLOGY

## 2021-12-07 PROCEDURE — 99244 OFF/OP CNSLTJ NEW/EST MOD 40: CPT | Performed by: PSYCHIATRY & NEUROLOGY

## 2021-12-07 PROCEDURE — G8419 CALC BMI OUT NRM PARAM NOF/U: HCPCS | Performed by: PSYCHIATRY & NEUROLOGY

## 2021-12-07 PROCEDURE — G8427 DOCREV CUR MEDS BY ELIG CLIN: HCPCS | Performed by: PSYCHIATRY & NEUROLOGY

## 2021-12-07 NOTE — PATIENT INSTRUCTIONS
1. MRI brain WO contrast  2. EEG  3. Tilt table test  4. 30 day cardiac event monitor  5. Vitamin B12, folate  6. Vitamin B6 level  7. You need to avoid alcohol as discussed  8. No driving, swimming, operating heavy machinery or compromising heights until event cleared. Report any new events. Call if any questions. 9. Call with any new symptoms or concerns. 10. Follow up after testing is completed .

## 2021-12-21 NOTE — PROGRESS NOTES
Chief Complaint   Patient presents with    Consultation     seizures         Eliceo Burnett is a 23 y.o. male who presents today for evaluation of passing out, seizure like activity since January 2020. He can sometimes sweat profusely prior to spells, and other times they occur without warning. He then has entire body convulsion and he vomits. No tongue biting, no incontinence of bowel or bladder during spells. He has never been told he had low blood pressure. No family history of seizure. He is not driving. He denies any illicit drug use, he consumes alcohol socially. His sleep is good, he wakes up feeling well rested. He does not snore. He takes daytime naps. CT head done 5/2/21 was normal. He denies chest pain. No shortness of breath, no neck pain. No vision changes. No dysphagia. No fever. No rash. No weight loss. History provided by patient. Past Medical History:   Diagnosis Date    Seizures ([facility] [place] [address]        There is no problem list on file for this patient. No Known Allergies    No current outpatient medications on file. No current facility-administered medications for this visit.        Social History     Socioeconomic History    Marital status: Single     Spouse name: None    Number of children: None    Years of education: None    Highest education level: None   Occupational History    None   Tobacco Use    Smoking status: Never Smoker    Smokeless tobacco: Never Used   Vaping Use    Vaping Use: Never used   Substance and Sexual Activity    Alcohol use: No     Alcohol/week: 0.0 standard drinks    Drug use: Yes     Frequency: 1.0 times per week     Types: Marijuana (Weed)     Comment: 4 times a week    Sexual activity: Yes     Partners: Female   Other Topics Concern    None   Social History Narrative    None     Social Determinants of Health     Financial Resource Strain:     Difficulty of Paying Living Expenses: Not on file   Food Insecurity:     Worried About Running Out of In [place] in the Last Year: Not on file    Ran Out of Food in the Last Year: Not on file   Transportation Needs:     Lack of Transportation (Medical): Not on file    Lack of Transportation (Non-Medical): Not on file   Physical Activity:     Days of Exercise per Week: Not on file    Minutes of Exercise per Session: Not on file   Stress:     Feeling of Stress : Not on file   Social Connections:     Frequency of Communication with Friends and Family: Not on file    Frequency of Social Gatherings with Friends and Family: Not on file    Attends Yarsani Services: Not on file    Active Member of [address] [place] [address] [employer] or Organizations: Not on file    Attends Club or Organization Meetings: Not on file    Marital Status: Not on file   Intimate Partner Violence:     Fear of Current or Ex-Partner: Not on file    Emotionally Abused: Not on file    Physically Abused: Not on file    Sexually Abused: Not on file   Housing Stability:     Unable to Pay for Housing in the Last Year: Not on file    Number of [place] in the Last Year: Not on file    Unstable Housing in the Last Year: Not on file       Family History   Problem Relation Age of Onset    No Known Problems Mother     Heart Attack Father     Heart Disease Sister     No Known Problems Brother          I reviewed the past medical history, allergies, medications, social history and family history.    Review of Systems   All systems reviewed, and are all negative, except what is mentioned in HPI      Vitals:    12/07/21 1329   BP: 126/70   Site: Right Upper Arm   Position: Sitting   Cuff Size: Medium Adult   Pulse: 79   SpO2: 99%   Weight: 171 lb (77.6 kg)   Height: 5' 5\" (1.651 m)       Physical Examination:  General appearance - alert, well appearing, and in no distress, oriented to person, place, and time and over weight  Mental status- Level of Alertness: awake  Orientation: person, place, time  Memory: normal  Fund of Knowledge: normal  Attention/Concentration: normal  Language: normal. Mood is normal.   Neck - supple, no significant adenopathy, carotids upstroke normal bilaterally. There is no axillary lymphadenopathy. There is no carotid bruit. No neck lymphadenopathy. No thyroid enlargement   Neurological -   Cranial Pyvueu-VM-HYZ:.   Cranial nerve II: Normal   Cranial nerve III: Pupils: equal, round, reactive to light  Cranial nerves III, IV, VI: Extraocular Movements: intact   Cranial nerve V: Facial sensation: intact   Cranial nerve VII:Facial strength: intact   Cranial nerve VIII: Hearing: intact   Cranial nerve IX: Palate Elevation intact bilaterally  Cranial nerve XI: Shoulder shrug intact bilaterally  Cranial nerve XII: Tongue midline   neck supple without rigidity, there is no limitation of range of motion of the neck. DTR's are Intact distal and symmetric  Babinski sign negative  Motor exam is 5/5 in the upper and lower extremities. Normal muscle tone. There is no muscle atrophy. Sensory is intact for light touch   Coordination: finger to nose, intact  Gait and station intact   Abnormal movement none, vibration normal, proprioception normal  Skin - warm, dry to touch, normal coloration, no rashes, no suspicious skin lesions  Superficial temporal artery pulses are normal.   There is no limitation of range of motion of the neck. There is no resting tremor, no pin rolling, no bradykinesia, no Hypohonia, normal blink rate. Musculoskeletal: Has no hand arthritis, no limitation of ROM in any of the four extremities. There is no leg edema. The Heart was regular in rate and rhythm. No heart murmur  Chest Clear, with  good effort. Abdomen soft, intact bowel sounds. CT HEAD WO CONTRAST (reviewed)    Narrative  PROCEDURE: CT HEAD WO CONTRAST  CLINICAL INFORMATION: Seizure. Seizure today. History of seizures. COMPARISON: Head CT 7/28/2020. TECHNIQUE: Noncontrast 5 mm axial images were obtained through the brain.  Sagittal and coronal reconstructions were obtained. All CT scans at this facility use dose modulation, iterative reconstruction, and/or weight-based dosing when appropriate to reduce radiation dose to as low as reasonably achievable. FINDINGS:  The brain volume is normal.  There is no hemorrhage. There are no intra-or extra-axial collections. There is no hydrocephalus, midline shift or mass effect. The gray-white matter differentiation is preserved. The paranasal sinuses and mastoid air cells are normally aerated. There is no suspicious calvarial abnormality. There is no significant change in the appearance of the brain compared to the prior study. Impression  Normal CT appearance of the brain. **This report has been created using voice recognition software. It may contain minor errors which are inherent in voice recognition technology. **  Final report electronically signed by Dr. Reina Mendez on 5/2/2021 11:11 AM    We reviewed the patient records from referring provider and available information in the EHR       ASSESSMENT:      Diagnosis Orders   1. Syncope and collapse     2. Seizure-like activity [facility])        This is a 52-year-old male who presents with episodes of passing out, seizure-like activity that have been ongoing intermittently, since January 2020. He can experience profuse sweating prior to episodes. His exam is nonfocal.  I reviewed CT head performed 5/2/2021 that was normal, and agree with interpretation. The patient was counseled about the symptoms, and work-up recommended. He will need to undergo work-up from both neurologic and cardiac standpoint. We will arrange for him to undergo an MRI of the brain without contrast to evaluate for organic causes of his symptoms as well as an EEG to screen for seizure tendency. We will also obtain a tilt table test to screen for orthostatic hypotension/vasovagal syncope as well as a 30-day cardiac event monitor to screen for underlying occult arrhythmia.   He was counseled on the importance of refraining from driving, swimming, operating heavy machinery, or compromising heights until cleared as well as to avoid alcohol as discussed. After detailed discussion with the patient we agreed on the following plan. Plan    1. MRI brain WO contrast  2. EEG  3. Tilt table test  4. 30 day cardiac event monitor  5. Vitamin B12, folate  6. Vitamin B6 level  7. You need to avoid alcohol as discussed  8. No driving, swimming, operating heavy machinery or compromising heights until event cleared. Report any new events. Call if any questions. 9. Call with any new symptoms or concerns. 10. Follow up after testing is completed .      Total time 65 min      Sherrilyn Severin, MD

## 2022-04-02 ENCOUNTER — HOSPITAL ENCOUNTER (EMERGENCY)
Age: 27
Discharge: HOME | End: 2022-04-02
Payer: COMMERCIAL

## 2022-04-02 ENCOUNTER — APPOINTMENT (OUTPATIENT)
Dept: GENERAL RADIOLOGY | Age: 27
End: 2022-04-02
Payer: COMMERCIAL

## 2022-04-02 VITALS
SYSTOLIC BLOOD PRESSURE: 132 MMHG | TEMPERATURE: 98.3 F | HEART RATE: 117 BPM | OXYGEN SATURATION: 97 % | DIASTOLIC BLOOD PRESSURE: 76 MMHG | RESPIRATION RATE: 16 BRPM

## 2022-04-02 DIAGNOSIS — S60.00XA CONTUSION OF LEFT HAND INCLUDING FINGERS, INITIAL ENCOUNTER: ICD-10-CM

## 2022-04-02 DIAGNOSIS — M79.642 LEFT HAND PAIN: Primary | ICD-10-CM

## 2022-04-02 DIAGNOSIS — S60.222A CONTUSION OF LEFT HAND INCLUDING FINGERS, INITIAL ENCOUNTER: ICD-10-CM

## 2022-04-02 PROCEDURE — 99282 EMERGENCY DEPT VISIT SF MDM: CPT

## 2022-04-02 PROCEDURE — 73110 X-RAY EXAM OF WRIST: CPT

## 2022-04-02 PROCEDURE — 6370000000 HC RX 637 (ALT 250 FOR IP): Performed by: NURSE PRACTITIONER

## 2022-04-02 PROCEDURE — 73130 X-RAY EXAM OF HAND: CPT

## 2022-04-02 RX ORDER — IBUPROFEN 800 MG/1
800 TABLET ORAL ONCE
Status: COMPLETED | OUTPATIENT
Start: 2022-04-02 | End: 2022-04-02

## 2022-04-02 RX ADMIN — IBUPROFEN 800 MG: 800 TABLET, FILM COATED ORAL at 18:45

## 2022-04-02 ASSESSMENT — ENCOUNTER SYMPTOMS
ABDOMINAL PAIN: 0
RHINORRHEA: 0
NAUSEA: 0
CONSTIPATION: 0
SINUS PRESSURE: 0
WHEEZING: 0
DIARRHEA: 0
COUGH: 0
SHORTNESS OF BREATH: 0
VOMITING: 0
EYE PAIN: 0
BLOOD IN STOOL: 0

## 2022-04-02 ASSESSMENT — PAIN SCALES - GENERAL: PAINLEVEL_OUTOF10: 7
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
## 2023-05-03 ENCOUNTER — APPOINTMENT (OUTPATIENT)
Dept: CT IMAGING | Age: 28
DRG: 812 | End: 2023-05-03
Payer: COMMERCIAL

## 2023-05-03 ENCOUNTER — HOSPITAL ENCOUNTER (INPATIENT)
Age: 28
LOS: 3 days | Discharge: HOME | DRG: 812 | End: 2023-05-06
Attending: EMERGENCY MEDICINE | Admitting: INTERNAL MEDICINE
Payer: COMMERCIAL

## 2023-05-03 ENCOUNTER — APPOINTMENT (OUTPATIENT)
Dept: GENERAL RADIOLOGY | Age: 28
DRG: 812 | End: 2023-05-03
Payer: COMMERCIAL

## 2023-05-03 ENCOUNTER — APPOINTMENT (OUTPATIENT)
Dept: CARDIAC CATH/INVASIVE PROCEDURES | Age: 28
DRG: 812 | End: 2023-05-03
Payer: COMMERCIAL

## 2023-05-03 ENCOUNTER — APPOINTMENT (OUTPATIENT)
Dept: MRI IMAGING | Age: 28
DRG: 812 | End: 2023-05-03
Payer: COMMERCIAL

## 2023-05-03 DIAGNOSIS — D72.829 LEUKOCYTOSIS, UNSPECIFIED TYPE: ICD-10-CM

## 2023-05-03 DIAGNOSIS — T50.904A DRUG OVERDOSE OF UNDETERMINED INTENT, INITIAL ENCOUNTER: Primary | ICD-10-CM

## 2023-05-03 DIAGNOSIS — E87.29 RESPIRATORY ACIDOSIS: ICD-10-CM

## 2023-05-03 PROBLEM — R56.9 SEIZURE (HCC): Status: ACTIVE | Noted: 2023-05-03

## 2023-05-03 LAB
ALBUMIN SERPL BCG-MCNC: 5.2 G/DL (ref 3.5–5.1)
ALP SERPL-CCNC: 105 U/L (ref 38–126)
ALT SERPL W/O P-5'-P-CCNC: 25 U/L (ref 11–66)
AMMONIA PLAS-MCNC: 47 UMOL/L (ref 11–60)
AMPHETAMINES UR QL SCN: NEGATIVE
ANION GAP SERPL CALC-SCNC: 26 MEQ/L (ref 8–16)
APAP SERPL-MCNC: < 5 UG/ML (ref 0–20)
AST SERPL-CCNC: 33 U/L (ref 5–40)
AUTO DIFF PNL BLD: ABNORMAL
B-OH-BUTYR SERPL-MSCNC: 1.15 MG/DL (ref 0.2–2.81)
BACTERIA URNS QL MICRO: ABNORMAL /HPF
BARBITURATES UR QL SCN: NEGATIVE
BASE EXCESS BLDA CALC-SCNC: -16.8 MMOL/L (ref -2–3)
BASE EXCESS BLDA CALC-SCNC: -3.7 MMOL/L (ref -2–3)
BASE EXCESS BLDA CALC-SCNC: -4.7 MMOL/L (ref -2–3)
BASOPHILS ABSOLUTE: 0 THOU/MM3 (ref 0–0.1)
BASOPHILS NFR BLD AUTO: 0.1 %
BENZODIAZ UR QL SCN: NEGATIVE
BILIRUB CONJ SERPL-MCNC: < 0.2 MG/DL (ref 0–0.3)
BILIRUB SERPL-MCNC: 0.2 MG/DL (ref 0.3–1.2)
BILIRUB UR QL STRIP.AUTO: NEGATIVE
BUN SERPL-MCNC: 18 MG/DL (ref 7–22)
BZE UR QL SCN: NEGATIVE
CALCIUM SERPL-MCNC: 9.3 MG/DL (ref 8.5–10.5)
CANNABINOIDS UR QL SCN: NORMAL
CASTS #/AREA URNS LPF: ABNORMAL /LPF
CHARACTER UR: CLEAR
CHLORIDE SERPL-SCNC: 104 MEQ/L (ref 98–111)
CK SERPL-CCNC: 703 U/L (ref 55–170)
CO2 SERPL-SCNC: 15 MEQ/L (ref 23–33)
COLLECTED BY:: ABNORMAL
COLOR: YELLOW
CREAT SERPL-MCNC: 1.3 MG/DL (ref 0.4–1.2)
CRP SERPL-MCNC: < 0.3 MG/DL (ref 0–1)
CRYSTALS URNS MICRO: ABNORMAL
DEPRECATED MEAN GLUCOSE BLD GHB EST-ACNC: 93 MG/DL (ref 70–126)
DEPRECATED RDW RBC AUTO: 43.2 FL (ref 35–45)
DEVICE: ABNORMAL
EKG ATRIAL RATE: 100 BPM
EKG ATRIAL RATE: 88 BPM
EKG P AXIS: 66 DEGREES
EKG P AXIS: 72 DEGREES
EKG P-R INTERVAL: 140 MS
EKG P-R INTERVAL: 142 MS
EKG Q-T INTERVAL: 306 MS
EKG Q-T INTERVAL: 322 MS
EKG QRS DURATION: 84 MS
EKG QRS DURATION: 84 MS
EKG QTC CALCULATION (BAZETT): 389 MS
EKG QTC CALCULATION (BAZETT): 394 MS
EKG R AXIS: 51 DEGREES
EKG R AXIS: 57 DEGREES
EKG T AXIS: 34 DEGREES
EKG T AXIS: 59 DEGREES
EKG VENTRICULAR RATE: 100 BPM
EKG VENTRICULAR RATE: 88 BPM
EOSINOPHIL NFR BLD AUTO: 0.5 %
EOSINOPHILS ABSOLUTE: 0.2 THOU/MM3 (ref 0–0.4)
EPITHELIAL CELLS, UA: ABNORMAL /HPF
ERYTHROCYTE [DISTWIDTH] IN BLOOD BY AUTOMATED COUNT: 12.6 % (ref 11.5–14.5)
ERYTHROCYTE [SEDIMENTATION RATE] IN BLOOD BY WESTERGREN METHOD: 1 MM/HR (ref 0–10)
ETHANOL SERPL-MCNC: < 0.01 %
FOLATE SERPL-MCNC: 15.9 NG/ML (ref 4.8–24.2)
GFR SERPL CREATININE-BSD FRML MDRD: > 60 ML/MIN/1.73M2
GLUCOSE BLD STRIP.AUTO-MCNC: 225 MG/DL (ref 70–108)
GLUCOSE SERPL-MCNC: 183 MG/DL (ref 70–108)
GLUCOSE UR QL STRIP.AUTO: >= 1000 MG/DL
HBA1C MFR BLD HPLC: 5.1 % (ref 4.4–6.4)
HCO3 BLDA-SCNC: 20 MMOL/L (ref 23–28)
HCO3 BLDA-SCNC: 21 MMOL/L (ref 23–28)
HCO3 BLDA-SCNC: 22 MMOL/L (ref 23–28)
HCT VFR BLD AUTO: 55.9 % (ref 42–52)
HGB BLD-MCNC: 18.6 GM/DL (ref 14–18)
HGB UR QL STRIP.AUTO: ABNORMAL
IMM GRANULOCYTES # BLD AUTO: 2.21 THOU/MM3 (ref 0–0.07)
IMM GRANULOCYTES NFR BLD AUTO: 5.4 %
KETONES UR QL STRIP.AUTO: NEGATIVE
LACTATE SERPL-SCNC: 4.8 MMOL/L (ref 0.5–2)
LACTIC ACID, SEPSIS: 3.1 MMOL/L (ref 0.5–1.9)
LACTIC ACID, SEPSIS: 5.1 MMOL/L (ref 0.5–1.9)
LV EF: 28 %
LV EF: 60 %
LVEF MODALITY: NORMAL
LVEF MODALITY: NORMAL
LYMPHOCYTES ABSOLUTE: 4.9 THOU/MM3 (ref 1–4.8)
LYMPHOCYTES NFR BLD AUTO: 12.1 %
MAGNESIUM SERPL-MCNC: 2.7 MG/DL (ref 1.6–2.4)
MCH RBC QN AUTO: 31.6 PG (ref 26–33)
MCHC RBC AUTO-ENTMCNC: 33.3 GM/DL (ref 32.2–35.5)
MCV RBC AUTO: 95.1 FL (ref 80–94)
MISCELLANEOUS 2: ABNORMAL
MONOCYTES ABSOLUTE: 2.2 THOU/MM3 (ref 0.4–1.3)
MONOCYTES NFR BLD AUTO: 5.4 %
MRSA DNA SPEC QL NAA+PROBE: NEGATIVE
MUCOUS THREADS URNS QL MICRO: ABNORMAL
NEUTROPHILS NFR BLD AUTO: 76.5 %
NITRITE UR QL STRIP: NEGATIVE
NRBC BLD AUTO-RTO: 0 /100 WBC
OPIATES UR QL SCN: NEGATIVE
OSMOLALITY SERPL CALC.SUM OF ELEC: 295.3 MOSMOL/KG (ref 275–300)
OXYCODONE: NEGATIVE
PATHOLOGIST REVIEW: ABNORMAL
PCO2 TEMP ADJ BLDMV: 110 MMHG (ref 41–51)
PCO2 TEMP ADJ BLDMV: 31 MMHG (ref 41–51)
PCO2 TEMP ADJ BLDMV: 46 MMHG (ref 41–51)
PH BLDMV: 6.88 [PH] (ref 7.31–7.41)
PH BLDMV: 7.29 [PH] (ref 7.31–7.41)
PH BLDMV: 7.42 [PH] (ref 7.31–7.41)
PH UR STRIP.AUTO: 5 [PH] (ref 5–9)
PHENCYCLIDINE QUANTITATIVE URINE: NEGATIVE
PHOSPHATE SERPL-MCNC: 5.1 MG/DL (ref 2.4–4.7)
PLATELET # BLD AUTO: 346 THOU/MM3 (ref 130–400)
PLATELET BLD QL SMEAR: ADEQUATE
PMV BLD AUTO: 9.2 FL (ref 9.4–12.4)
PO2 BLDMV: 33 MMHG (ref 25–40)
PO2 BLDMV: 36 MMHG (ref 25–40)
PO2 BLDMV: 47 MMHG (ref 25–40)
POTASSIUM SERPL-SCNC: 3.5 MEQ/L (ref 3.5–5.2)
PROLACTIN SERPL-MCNC: 25.5 NG/ML
PROT SERPL-MCNC: 7.8 G/DL (ref 6.1–8)
PROT UR STRIP.AUTO-MCNC: 100 MG/DL
RBC # BLD AUTO: 5.88 MILL/MM3 (ref 4.7–6.1)
RBC URINE: ABNORMAL /HPF
REASON FOR REJECTION: NORMAL
REASON FOR REJECTION: NORMAL
REJECTED TEST: NORMAL
REJECTED TEST: NORMAL
RENAL EPI CELLS #/AREA URNS HPF: ABNORMAL /[HPF]
ROULEAUX BLD QL SMEAR: SLIGHT
SALICYLATES SERPL-MCNC: < 0.3 MG/DL (ref 2–10)
SAO2 % BLDMV: 29 %
SAO2 % BLDMV: 62 %
SAO2 % BLDMV: 84 %
SCAN OF BLOOD SMEAR: NORMAL
SEGMENTED NEUTROPHILS ABSOLUTE COUNT: 31.2 THOU/MM3 (ref 1.8–7.7)
SITE: ABNORMAL
SODIUM SERPL-SCNC: 145 MEQ/L (ref 135–145)
SP GR UR REFRACT.AUTO: 1.01 (ref 1–1.03)
TROPONIN T: 0.04 NG/ML
TROPONIN T: 0.08 NG/ML
TROPONIN T: 0.08 NG/ML
TSH SERPL DL<=0.005 MIU/L-ACNC: 1.24 UIU/ML (ref 0.4–4.2)
UROBILINOGEN, URINE: 0.2 EU/DL (ref 0–1)
VANA ISLT/SPM QL: NEGATIVE
VIT B12 SERPL-MCNC: 795 PG/ML (ref 211–911)
WBC # BLD AUTO: 40.8 THOU/MM3 (ref 4.8–10.8)
WBC #/AREA URNS HPF: ABNORMAL /HPF
WBC #/AREA URNS HPF: NEGATIVE /[HPF]
YEAST LIKE FUNGI URNS QL MICRO: ABNORMAL

## 2023-05-03 PROCEDURE — 2580000003 HC RX 258: Performed by: EMERGENCY MEDICINE

## 2023-05-03 PROCEDURE — 6360000002 HC RX W HCPCS: Performed by: EMERGENCY MEDICINE

## 2023-05-03 PROCEDURE — 84207 ASSAY OF VITAMIN B-6: CPT

## 2023-05-03 PROCEDURE — 95816 EEG AWAKE AND DROWSY: CPT

## 2023-05-03 PROCEDURE — 6360000002 HC RX W HCPCS

## 2023-05-03 PROCEDURE — C1769 GUIDE WIRE: HCPCS

## 2023-05-03 PROCEDURE — 93005 ELECTROCARDIOGRAM TRACING: CPT | Performed by: STUDENT IN AN ORGANIZED HEALTH CARE EDUCATION/TRAINING PROGRAM

## 2023-05-03 PROCEDURE — 99223 1ST HOSP IP/OBS HIGH 75: CPT | Performed by: INTERNAL MEDICINE

## 2023-05-03 PROCEDURE — 83605 ASSAY OF LACTIC ACID: CPT

## 2023-05-03 PROCEDURE — 36415 COLL VENOUS BLD VENIPUNCTURE: CPT

## 2023-05-03 PROCEDURE — 93458 L HRT ARTERY/VENTRICLE ANGIO: CPT

## 2023-05-03 PROCEDURE — 83735 ASSAY OF MAGNESIUM: CPT

## 2023-05-03 PROCEDURE — 87040 BLOOD CULTURE FOR BACTERIA: CPT

## 2023-05-03 PROCEDURE — 85025 COMPLETE CBC W/AUTO DIFF WBC: CPT

## 2023-05-03 PROCEDURE — 82550 ASSAY OF CK (CPK): CPT

## 2023-05-03 PROCEDURE — 84484 ASSAY OF TROPONIN QUANT: CPT

## 2023-05-03 PROCEDURE — 93306 TTE W/DOPPLER COMPLETE: CPT

## 2023-05-03 PROCEDURE — 70450 CT HEAD/BRAIN W/O DYE: CPT

## 2023-05-03 PROCEDURE — 82948 REAGENT STRIP/BLOOD GLUCOSE: CPT

## 2023-05-03 PROCEDURE — 71045 X-RAY EXAM CHEST 1 VIEW: CPT

## 2023-05-03 PROCEDURE — B2151ZZ FLUOROSCOPY OF LEFT HEART USING LOW OSMOLAR CONTRAST: ICD-10-PCS | Performed by: INTERNAL MEDICINE

## 2023-05-03 PROCEDURE — 96366 THER/PROPH/DIAG IV INF ADDON: CPT

## 2023-05-03 PROCEDURE — G0480 DRUG TEST DEF 1-7 CLASSES: HCPCS

## 2023-05-03 PROCEDURE — 6370000000 HC RX 637 (ALT 250 FOR IP): Performed by: STUDENT IN AN ORGANIZED HEALTH CARE EDUCATION/TRAINING PROGRAM

## 2023-05-03 PROCEDURE — 6360000002 HC RX W HCPCS: Performed by: INTERNAL MEDICINE

## 2023-05-03 PROCEDURE — 84146 ASSAY OF PROLACTIN: CPT

## 2023-05-03 PROCEDURE — 2060000000 HC ICU INTERMEDIATE R&B

## 2023-05-03 PROCEDURE — B2111ZZ FLUOROSCOPY OF MULTIPLE CORONARY ARTERIES USING LOW OSMOLAR CONTRAST: ICD-10-PCS | Performed by: INTERNAL MEDICINE

## 2023-05-03 PROCEDURE — 83036 HEMOGLOBIN GLYCOSYLATED A1C: CPT

## 2023-05-03 PROCEDURE — 80053 COMPREHEN METABOLIC PANEL: CPT

## 2023-05-03 PROCEDURE — 82077 ASSAY SPEC XCP UR&BREATH IA: CPT

## 2023-05-03 PROCEDURE — 86140 C-REACTIVE PROTEIN: CPT

## 2023-05-03 PROCEDURE — 36600 WITHDRAWAL OF ARTERIAL BLOOD: CPT

## 2023-05-03 PROCEDURE — 87070 CULTURE OTHR SPECIMN AEROBIC: CPT

## 2023-05-03 PROCEDURE — 82010 KETONE BODYS QUAN: CPT

## 2023-05-03 PROCEDURE — 2580000003 HC RX 258: Performed by: INTERNAL MEDICINE

## 2023-05-03 PROCEDURE — 81001 URINALYSIS AUTO W/SCOPE: CPT

## 2023-05-03 PROCEDURE — 96375 TX/PRO/DX INJ NEW DRUG ADDON: CPT

## 2023-05-03 PROCEDURE — 2580000003 HC RX 258: Performed by: STUDENT IN AN ORGANIZED HEALTH CARE EDUCATION/TRAINING PROGRAM

## 2023-05-03 PROCEDURE — 80179 DRUG ASSAY SALICYLATE: CPT

## 2023-05-03 PROCEDURE — 82140 ASSAY OF AMMONIA: CPT

## 2023-05-03 PROCEDURE — 2500000003 HC RX 250 WO HCPCS: Performed by: EMERGENCY MEDICINE

## 2023-05-03 PROCEDURE — 93005 ELECTROCARDIOGRAM TRACING: CPT | Performed by: INTERNAL MEDICINE

## 2023-05-03 PROCEDURE — 82248 BILIRUBIN DIRECT: CPT

## 2023-05-03 PROCEDURE — 85651 RBC SED RATE NONAUTOMATED: CPT

## 2023-05-03 PROCEDURE — 80143 DRUG ASSAY ACETAMINOPHEN: CPT

## 2023-05-03 PROCEDURE — 82746 ASSAY OF FOLIC ACID SERUM: CPT

## 2023-05-03 PROCEDURE — 99285 EMERGENCY DEPT VISIT HI MDM: CPT

## 2023-05-03 PROCEDURE — 80305 DRUG TEST PRSMV DIR OPT OBS: CPT

## 2023-05-03 PROCEDURE — 87641 MR-STAPH DNA AMP PROBE: CPT

## 2023-05-03 PROCEDURE — 2500000003 HC RX 250 WO HCPCS

## 2023-05-03 PROCEDURE — 96365 THER/PROPH/DIAG IV INF INIT: CPT

## 2023-05-03 PROCEDURE — 82607 VITAMIN B-12: CPT

## 2023-05-03 PROCEDURE — 84443 ASSAY THYROID STIM HORMONE: CPT

## 2023-05-03 PROCEDURE — 82803 BLOOD GASES ANY COMBINATION: CPT

## 2023-05-03 PROCEDURE — 6360000004 HC RX CONTRAST MEDICATION: Performed by: INTERNAL MEDICINE

## 2023-05-03 PROCEDURE — 87500 VANOMYCIN DNA AMP PROBE: CPT

## 2023-05-03 PROCEDURE — 93010 ELECTROCARDIOGRAM REPORT: CPT | Performed by: NUCLEAR MEDICINE

## 2023-05-03 PROCEDURE — 4A023N7 MEASUREMENT OF CARDIAC SAMPLING AND PRESSURE, LEFT HEART, PERCUTANEOUS APPROACH: ICD-10-PCS | Performed by: INTERNAL MEDICINE

## 2023-05-03 PROCEDURE — 99254 IP/OBS CNSLTJ NEW/EST MOD 60: CPT | Performed by: INTERNAL MEDICINE

## 2023-05-03 PROCEDURE — C1894 INTRO/SHEATH, NON-LASER: HCPCS

## 2023-05-03 PROCEDURE — 84100 ASSAY OF PHOSPHORUS: CPT

## 2023-05-03 PROCEDURE — 6360000002 HC RX W HCPCS: Performed by: PHYSICIAN ASSISTANT

## 2023-05-03 PROCEDURE — 93458 L HRT ARTERY/VENTRICLE ANGIO: CPT | Performed by: INTERNAL MEDICINE

## 2023-05-03 PROCEDURE — 94762 N-INVAS EAR/PLS OXIMTRY CONT: CPT

## 2023-05-03 PROCEDURE — 95819 EEG AWAKE AND ASLEEP: CPT | Performed by: PSYCHIATRY & NEUROLOGY

## 2023-05-03 RX ORDER — LORAZEPAM 2 MG/ML
1 INJECTION INTRAMUSCULAR EVERY 5 MIN PRN
Status: DISCONTINUED | OUTPATIENT
Start: 2023-05-03 | End: 2023-05-06 | Stop reason: HOSPADM

## 2023-05-03 RX ORDER — MAGNESIUM SULFATE IN WATER 40 MG/ML
2000 INJECTION, SOLUTION INTRAVENOUS PRN
Status: DISCONTINUED | OUTPATIENT
Start: 2023-05-03 | End: 2023-05-06 | Stop reason: HOSPADM

## 2023-05-03 RX ORDER — ONDANSETRON 2 MG/ML
4 INJECTION INTRAMUSCULAR; INTRAVENOUS EVERY 6 HOURS PRN
Status: DISCONTINUED | OUTPATIENT
Start: 2023-05-03 | End: 2023-05-06 | Stop reason: HOSPADM

## 2023-05-03 RX ORDER — DIPHENHYDRAMINE HYDROCHLORIDE 50 MG/ML
25 INJECTION INTRAMUSCULAR; INTRAVENOUS ONCE
Status: COMPLETED | OUTPATIENT
Start: 2023-05-03 | End: 2023-05-03

## 2023-05-03 RX ORDER — 0.9 % SODIUM CHLORIDE 0.9 %
1000 INTRAVENOUS SOLUTION INTRAVENOUS ONCE
Status: COMPLETED | OUTPATIENT
Start: 2023-05-03 | End: 2023-05-03

## 2023-05-03 RX ORDER — NALOXONE HYDROCHLORIDE 1 MG/ML
1 INJECTION INTRAMUSCULAR; INTRAVENOUS; SUBCUTANEOUS PRN
Status: DISCONTINUED | OUTPATIENT
Start: 2023-05-03 | End: 2023-05-06 | Stop reason: HOSPADM

## 2023-05-03 RX ORDER — SODIUM CHLORIDE 9 MG/ML
INJECTION, SOLUTION INTRAVENOUS PRN
Status: DISCONTINUED | OUTPATIENT
Start: 2023-05-03 | End: 2023-05-06 | Stop reason: HOSPADM

## 2023-05-03 RX ORDER — LORAZEPAM 2 MG/ML
0.5 INJECTION INTRAMUSCULAR ONCE
Status: COMPLETED | OUTPATIENT
Start: 2023-05-03 | End: 2023-05-03

## 2023-05-03 RX ORDER — SODIUM CHLORIDE, SODIUM LACTATE, POTASSIUM CHLORIDE, CALCIUM CHLORIDE 600; 310; 30; 20 MG/100ML; MG/100ML; MG/100ML; MG/100ML
INJECTION, SOLUTION INTRAVENOUS CONTINUOUS
Status: ACTIVE | OUTPATIENT
Start: 2023-05-03 | End: 2023-05-04

## 2023-05-03 RX ORDER — POTASSIUM CHLORIDE 7.45 MG/ML
10 INJECTION INTRAVENOUS PRN
Status: DISCONTINUED | OUTPATIENT
Start: 2023-05-03 | End: 2023-05-06 | Stop reason: HOSPADM

## 2023-05-03 RX ORDER — SODIUM CHLORIDE 0.9 % (FLUSH) 0.9 %
5-40 SYRINGE (ML) INJECTION PRN
Status: DISCONTINUED | OUTPATIENT
Start: 2023-05-03 | End: 2023-05-06 | Stop reason: HOSPADM

## 2023-05-03 RX ORDER — SODIUM CHLORIDE 9 MG/ML
INJECTION, SOLUTION INTRAVENOUS CONTINUOUS
Status: DISCONTINUED | OUTPATIENT
Start: 2023-05-03 | End: 2023-05-04

## 2023-05-03 RX ORDER — SODIUM CHLORIDE 0.9 % (FLUSH) 0.9 %
5-40 SYRINGE (ML) INJECTION EVERY 12 HOURS SCHEDULED
Status: DISCONTINUED | OUTPATIENT
Start: 2023-05-03 | End: 2023-05-06 | Stop reason: HOSPADM

## 2023-05-03 RX ORDER — POTASSIUM CHLORIDE 20 MEQ/1
40 TABLET, EXTENDED RELEASE ORAL PRN
Status: DISCONTINUED | OUTPATIENT
Start: 2023-05-03 | End: 2023-05-06 | Stop reason: HOSPADM

## 2023-05-03 RX ORDER — ACETAMINOPHEN 325 MG/1
650 TABLET ORAL EVERY 4 HOURS PRN
Status: DISCONTINUED | OUTPATIENT
Start: 2023-05-03 | End: 2023-05-06 | Stop reason: HOSPADM

## 2023-05-03 RX ORDER — LEVETIRACETAM 500 MG/1
500 TABLET ORAL EVERY 12 HOURS
Status: DISCONTINUED | OUTPATIENT
Start: 2023-05-03 | End: 2023-05-04 | Stop reason: ALTCHOICE

## 2023-05-03 RX ADMIN — SODIUM CHLORIDE 1000 ML: 9 INJECTION, SOLUTION INTRAVENOUS at 06:57

## 2023-05-03 RX ADMIN — IOPAMIDOL 75 ML: 755 INJECTION, SOLUTION INTRAVENOUS at 15:44

## 2023-05-03 RX ADMIN — NALOXONE HYDROCHLORIDE 0.4 MG/HR: 1 INJECTION PARENTERAL at 07:01

## 2023-05-03 RX ADMIN — SODIUM CHLORIDE, POTASSIUM CHLORIDE, SODIUM LACTATE AND CALCIUM CHLORIDE: 600; 310; 30; 20 INJECTION, SOLUTION INTRAVENOUS at 12:31

## 2023-05-03 RX ADMIN — LORAZEPAM 0.5 MG: 2 INJECTION INTRAMUSCULAR; INTRAVENOUS at 14:37

## 2023-05-03 RX ADMIN — LEVETIRACETAM 500 MG: 500 TABLET, FILM COATED ORAL at 12:23

## 2023-05-03 RX ADMIN — POTASSIUM BICARBONATE 40 MEQ: 782 TABLET, EFFERVESCENT ORAL at 12:22

## 2023-05-03 RX ADMIN — ONDANSETRON 4 MG: 2 INJECTION INTRAMUSCULAR; INTRAVENOUS at 18:14

## 2023-05-03 RX ADMIN — SODIUM CHLORIDE: 9 INJECTION, SOLUTION INTRAVENOUS at 16:34

## 2023-05-03 RX ADMIN — DIPHENHYDRAMINE HYDROCHLORIDE 25 MG: 50 INJECTION, SOLUTION INTRAMUSCULAR; INTRAVENOUS at 23:39

## 2023-05-03 RX ADMIN — SODIUM BICARBONATE: 84 INJECTION, SOLUTION INTRAVENOUS at 08:24

## 2023-05-03 RX ADMIN — NALOXONE HYDROCHLORIDE 1 MG: 1 INJECTION PARENTERAL at 06:19

## 2023-05-03 RX ADMIN — NALOXONE HYDROCHLORIDE 1 MG: 1 INJECTION PARENTERAL at 06:24

## 2023-05-03 ASSESSMENT — ENCOUNTER SYMPTOMS
DIARRHEA: 0
TROUBLE SWALLOWING: 0
NAUSEA: 1
VOMITING: 1
SHORTNESS OF BREATH: 0
COUGH: 0
PHOTOPHOBIA: 0
COLOR CHANGE: 0

## 2023-05-03 ASSESSMENT — PAIN - FUNCTIONAL ASSESSMENT
PAIN_FUNCTIONAL_ASSESSMENT: WONG-BAKER FACES
PAIN_FUNCTIONAL_ASSESSMENT: NONE - DENIES PAIN

## 2023-05-03 ASSESSMENT — PAIN SCALES - WONG BAKER: WONGBAKER_NUMERICALRESPONSE: 0

## 2023-05-03 NOTE — ED NOTES
Dr. Alethea Mirza at bedside     [facility]  05/03/23 2849
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
## 2023-05-03 NOTE — PROGRESS NOTES
Pt admitted to  4K2 from ED. Complaints: None. IV site free of s/s of infection or infiltration. Vital signs obtained. Assessment and data collection initiated. Two nurse skin assessment performed by gabriel PRESSLEY and Milton Barnes. Oriented to room. Policies and procedures for  explained. All questions answered with no further questions at this time. Fall prevention and safety brochure discussed with patient. Bed alarm on. Call light in reach. Would you like your Primary Care Physician notified?  no   The best day to schedule a follow up Dr appointment is:  Monday, Tuesday, and Wednesday a.m.

## 2023-05-03 NOTE — ED NOTES
Dr. Christel Cat approved water. Pt given water at this time. Passed swallow screen.       Maryellen Castillo RN  05/03/23 0166

## 2023-05-03 NOTE — ED TRIAGE NOTES
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 EMS states that the pt was breathing poorly on their arrival. Pt received 4mg of narcan from EMS pta. Pt arrives to ED with snoring respirations and nasal airway in place. Pt has a history of seizures. Pt is unable to answer orientation questions at this time.

## 2023-05-03 NOTE — ED NOTES
Pt resting on cot with eyes closed. Pt easily aroused. Family at bedside. Call light in reach. Fluids started per [facility].       Kavita Sheppard RN  05/03/23 1154

## 2023-05-03 NOTE — H&P
Demographic information:  Raman Benitez  1995  [id number]      Assessment/Plan:  Seizure-like activity with syncope and LOC -patient endorsed history of seizures in the past but is not on maintenance medications and does not follow with neurology. 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 On exam alert and oriented. CT head and CXR normal.  Asked for neurology's input. Ordered EEG. Also TTE, troponin and EKG to eval for cardiogenic syncope/HCM d/t spatiotemporal trend at presentation. Holter monitor on discharge. Acute hypercapnic respiratory failure -present on arrival.  Improved with Narcan. Ordered overnight oxygen desaturation screen. Questionable drug overdose- patient denied drug use other than marijuana. UDS negative. However patient did show improvement with Narcan administration therefore Narcan gtt is ordered. Also unclear intent therefore ordered sitter at bedside and psychiatry input. HAGMA -initial AG 26, pH 6.88 and PCO2 110, normal O2. Now improved to pH 7.29, PCO2 46. Thought to be from lactic acidosis and hypercapnia and being down for some time before EMS arrived. Salicylate and acetaminophen levels not elevated. Ordered LFTs and ammonia. ROSEMARIE 2/2 ATN -prerenal, given fluids, will continue maintenance fluids for now. Anticipate improvement. Leukocytosis -most likely reactive and from hemoconcentration. Ordered path review of white cells and cultures. Patient denied symptoms. Reactive hyperglycemia -no personal history of DM, beta hydroxybutyrate and A1c normal.  Erythrocytosis 2/2 hemoconcentration -anticipate improvement after fluids. Marijuana use disorder -endorsed ganja use and tested positive. Subjective: On exam patient is alert and oriented reports cooking some food at work, he works at a gas station, then he is unsure what happened but thinks he might of had a seizure.

## 2023-05-03 NOTE — PROCEDURES
EEG REPORT       Patient: Susan Kelly Age: 33 y.o. MRN: [id number]      Referring Provider: No ref. provider found    History: This routine 30 minute scalp EEG was recorded with video- monitoring for a 33 y.o.. male  who presented with encephalopathy. This EEG was performed to evaluate for focal and epileptiform abnormalities. Uri Horne   Current Facility-Administered Medications   Medication Dose Route Frequency Provider Last Rate Last Admin    naloxone [facility]) injection 1 mg  1 mg IntraVENous PRN Dorthea President, DO   1 mg at 05/03/23 0624    naloxone [facility]) injection 1 mg  1 mg IntraVENous PRN Dorthea President, DO   1 mg at 05/03/23 0619    naloxone (NARCAN) 4 mg in sodium chloride 0.9 % 250 mL infusion  0.4 mg/hr IntraVENous Continuous Dorthea President, DO 25 mL/hr at 05/03/23 0701 0.4 mg/hr at 05/03/23 0701    sodium bicarbonate 150 mEq in dextrose 5 % 1,000 mL infusion   IntraVENous Continuous Mala Piper, DO 50 mL/hr at 05/03/23 0824 New Bag at 05/03/23 0824    LORazepam (ATIVAN) injection 1 mg  1 mg IntraVENous Q5 Min PRN Chad Ha MD        levETIRAcetam (KEPPRA) tablet 500 mg  500 mg Oral Q12H Chad Ha MD   500 mg at 05/03/23 1223    lactated ringers IV soln infusion   IntraVENous Continuous Chad Ha  mL/hr at 05/03/23 1231 New Bag at 05/03/23 1231    potassium chloride (KLOR-CON M) extended release tablet 40 mEq  40 mEq Oral PRN Chad Ha MD        Or    potassium bicarb-citric acid (EFFER-K) effervescent tablet 40 mEq  40 mEq Oral PRN Chad Ha MD   40 mEq at 05/03/23 1222    Or    potassium chloride 10 mEq/100 mL IVPB (Peripheral Line)  10 mEq IntraVENous PRN Chad Ha MD        magnesium sulfate 2000 mg in 50 mL IVPB premix  2,000 mg IntraVENous PRN Chad Ha MD            Technical Description: This is a 21 channel digital EEG recording with time-locked video.  Electrodes were placed in accordance with the 10-20

## 2023-05-03 NOTE — ED NOTES
Pt resting on cot. No distress noted. RR even and unlabored. Call light in reach. Family at bedside. Pt denies any needs at this time.       Hero Hawthorne RN  05/03/23 9847

## 2023-05-03 NOTE — ED NOTES
Pt opening eyes spontaneously at this time. Pt able to tell staff that he is at the hospital. Pt attempting to rip off vitals monitoring devices.       Manny Lin RN  05/03/23 7411

## 2023-05-03 NOTE — ED NOTES
ED to inpatient nurses report    Chief Complaint   Patient presents with    Other      Present to ED from home  LOC: alert and orientated to name, place, date  Vital signs   Vitals:    05/03/23 0633 05/03/23 0658 05/03/23 0827 05/03/23 0933   BP: (!) 146/103 (!) 145/95 125/72 (!) 118/58   Pulse: (!) 115 (!) 120 (!) 108 (!) 112   Resp: 28 22 16 14   Temp: 98.3 °F (36.8 °C)      TempSrc: Axillary      SpO2: 98% 97% 95% 97%   Weight: 190 lb (86.2 kg)      Height: 5' 3\" (1.6 m)         Oxygen Baseline 97%    Current needs required RA Bipap/Cpap No  LDAs:   Peripheral IV 05/03/23 Right Antecubital (Active)   Site Assessment Clean, dry & intact 05/03/23 0827   Line Status Infusing 05/03/23 0827   Phlebitis Assessment No symptoms 05/03/23 0827   Infiltration Assessment 0 05/03/23 0827       Peripheral IV 05/03/23 Left;Proximal Forearm (Active)   Site Assessment Clean, dry & intact 05/03/23 0827   Line Status Infusing 05/03/23 0827   Phlebitis Assessment No symptoms 05/03/23 0827   Infiltration Assessment 0 05/03/23 0827   Dressing Status New dressing applied;Clean, dry & intact 05/03/23 0656   Dressing Type Transparent 05/03/23 0656   Dressing Intervention New 05/03/23 0656     Mobility: Independent  Pending ED orders: NA  Present condition: stable      C-SSRS    Swallow Screening    Preferred Language: [place]     Electronically signed by eJny Ramirez RN on 5/3/2023 at 10:19 AM       Jeny Ramirez RN  05/03/23 8867

## 2023-05-03 NOTE — ED NOTES
This RN assumes care of pt at this time. Report received from [facility]. Pt appears to be resting on his right side. Eyes open. Talking with family. Pt provided with urinal. Pt states he will pee when he gets water.       Hero Hawthorne RN  05/03/23 1481

## 2023-05-03 NOTE — FLOWSHEET NOTE
05/03/23 1340   Safe Environment   Safety Measures Other (comment)  (VN admission)     VN called into patients room and introduced myself and role. Patient answered and permitted video. Video activated. . Patient resting comfortably in bed. VN completed admission documents with patient at this time. Patient voiced no needs or concerns at this time. Call light within reach. Sitter at bedside.

## 2023-05-03 NOTE — PROGRESS NOTES
[address] [address]  Sedation/Analgesia Post Sedation Record        Pt Name: Lisa Tilley  MRN: [id number]  YOB: 1995  Procedure Performed By: Shane Mandel MD MD, Jayson Plunkett, Replaced by [facility]   Primary Care Physician: No primary care provider on file. POST-PROCEDURE    Sedation/Anesthesia:  Local Anesthesia and IV Conscious Sedation with continuous O2 monitoring    Estimated Blood Loss: 10 cc     Specimens Removed:  [x]None []Other:      Disposition of Specimen:  []Pathology []Other        Complications:   [x]None Immediate []Other:       Procedure performed: Left heart cath    Post Procedure Diagnosis/Findings:    Clean coronaries  EF improved to 55%    Recommendations:    Medical treatment  Routine post-cath care.                Shane Mandel MD MD, Jayson Plunkett, VI  Electronically signed 5/3/2023 at 4:10 PM

## 2023-05-03 NOTE — PROGRESS NOTES
[facility]  Sedation/Analgesia History & Physical    Pt Name: Kevin Dyson  Account number: [de-identified]  MRN: [id number]  YOB: 1995  Provider Performing Procedure: Yesi Pierre MD MD [facility]  Primary Care Physician: No primary care provider on file. Date: 5/3/2023    PRE-PROCEDURE    Code Status: FULL CODE  Brief History/Pre-Procedure Diagnosis: LV dysfunction    Consent: : I have discussed with the patient risks, benefits, and alternatives (and relevant risks, benefits, and side effects related to alternatives or not receiving care), and likelihood of the success. The patient and/or representative appear to understand and agree to proceed. The discussion encompasses risks, benefits, and side effects related to the alternatives and the risks related to not receiving the proposed care, treatment, and services. PLANNED PROCEDURE   [x]Cath  [x]PCI                []Pacemaker/AICD  []CATRACHITO             []Cardioversion []Peripheral angiography/PTA  []Other:      VITAL SIGNS   Vitals:    05/03/23 1427   BP: 110/77   Pulse: 92   Resp: 20   Temp: 98.2 °F (36.8 °C)   SpO2: 100%       PHYSICAL:   General: No acute distress  HEENT:  Unremarkable for age  Neck: without increased JVD, carotid pulses 2+ bilaterally without bruits  Heart: RRR, S1 & S2 WNL   Lungs: Clear to auscultation    Abdomen: BS present, without HSM, masses, or tenderness    Extremities: without C,C,E.  Pulses 2+ bilaterally  Mental Status: Alert & Oriented    SEDATION  Planned agent:[x]Midazolam []Meperidine []Sublimaze []Morphine  []Diazepam  [x]Other: fentanyl      ASA Classification:  []1 []2 [x]3 []4 []5  Class 1: A normal healthy patient  Class 2: Pt with mild to moderate systemic disease  Class 3: Severe systemic disease or disturbance  Class 4: Severe systemic disorders that are already life threatening. Class 5: Moribund pt with little chances of survival, for more than 24 hours.   Mallampati I Airway

## 2023-05-03 NOTE — FLOWSHEET NOTE
05/03/23 1121   Safe Environment   Safety Measures Other (comment)  (VN attempted admission)     VN called into patients room and introduced myself and role. Staff member requested VN come back.

## 2023-05-04 ENCOUNTER — APPOINTMENT (OUTPATIENT)
Dept: MRI IMAGING | Age: 28
DRG: 812 | End: 2023-05-04
Payer: COMMERCIAL

## 2023-05-04 ENCOUNTER — APPOINTMENT (OUTPATIENT)
Dept: CARDIAC CATH/INVASIVE PROCEDURES | Age: 28
DRG: 812 | End: 2023-05-04
Payer: COMMERCIAL

## 2023-05-04 PROBLEM — R55 SYNCOPE: Status: ACTIVE | Noted: 2023-05-04

## 2023-05-04 PROBLEM — R74.8 ELEVATED CK: Status: ACTIVE | Noted: 2023-05-04

## 2023-05-04 PROBLEM — D72.829 LEUKOCYTOSIS: Status: ACTIVE | Noted: 2023-05-04

## 2023-05-04 LAB
ABO: NORMAL
ALBUMIN SERPL BCG-MCNC: 4.5 G/DL (ref 3.5–5.1)
ALP SERPL-CCNC: 65 U/L (ref 38–126)
ALT SERPL W/O P-5'-P-CCNC: 20 U/L (ref 11–66)
ANION GAP SERPL CALC-SCNC: 14 MEQ/L (ref 8–16)
ANTIBODY SCREEN: NORMAL
APTT PPP: 27.5 SECONDS (ref 22–38)
APTT PPP: 27.7 SECONDS (ref 22–38)
AST SERPL-CCNC: 34 U/L (ref 5–40)
BASOPHILS ABSOLUTE: 0 THOU/MM3 (ref 0–0.1)
BASOPHILS NFR BLD AUTO: 0.1 %
BILIRUB SERPL-MCNC: 0.5 MG/DL (ref 0.3–1.2)
BUN SERPL-MCNC: 14 MG/DL (ref 7–22)
CALCIUM SERPL-MCNC: 8.6 MG/DL (ref 8.5–10.5)
CHLORIDE SERPL-SCNC: 101 MEQ/L (ref 98–111)
CK SERPL-CCNC: 1102 U/L (ref 55–170)
CO2 SERPL-SCNC: 23 MEQ/L (ref 23–33)
CREAT SERPL-MCNC: 0.8 MG/DL (ref 0.4–1.2)
DEPRECATED RDW RBC AUTO: 39.2 FL (ref 35–45)
EKG ATRIAL RATE: 58 BPM
EKG P AXIS: 44 DEGREES
EKG P-R INTERVAL: 132 MS
EKG Q-T INTERVAL: 396 MS
EKG QRS DURATION: 90 MS
EKG QTC CALCULATION (BAZETT): 388 MS
EKG R AXIS: 41 DEGREES
EKG T AXIS: 39 DEGREES
EKG VENTRICULAR RATE: 58 BPM
EOSINOPHIL NFR BLD AUTO: 0 %
EOSINOPHILS ABSOLUTE: 0 THOU/MM3 (ref 0–0.4)
ERYTHROCYTE [DISTWIDTH] IN BLOOD BY AUTOMATED COUNT: 12.2 % (ref 11.5–14.5)
GFR SERPL CREATININE-BSD FRML MDRD: > 60 ML/MIN/1.73M2
GLUCOSE SERPL-MCNC: 89 MG/DL (ref 70–108)
HCT VFR BLD AUTO: 41.1 % (ref 42–52)
HGB BLD-MCNC: 14.5 GM/DL (ref 14–18)
HGB UR QL STRIP.AUTO: NEGATIVE
IMM GRANULOCYTES # BLD AUTO: 0.14 THOU/MM3 (ref 0–0.07)
IMM GRANULOCYTES NFR BLD AUTO: 0.7 %
INR PPP: 1.11 (ref 0.85–1.13)
LACTATE SERPL-SCNC: 1.1 MMOL/L (ref 0.5–2)
LACTATE SERPL-SCNC: 2.3 MMOL/L (ref 0.5–2)
LYMPHOCYTES ABSOLUTE: 2 THOU/MM3 (ref 1–4.8)
LYMPHOCYTES NFR BLD AUTO: 9.8 %
MCH RBC QN AUTO: 31.4 PG (ref 26–33)
MCHC RBC AUTO-ENTMCNC: 35.3 GM/DL (ref 32.2–35.5)
MCV RBC AUTO: 89 FL (ref 80–94)
MONOCYTES ABSOLUTE: 1.8 THOU/MM3 (ref 0.4–1.3)
MONOCYTES NFR BLD AUTO: 8.7 %
NEUTROPHILS NFR BLD AUTO: 80.7 %
NRBC BLD AUTO-RTO: 0 /100 WBC
PLATELET # BLD AUTO: 206 THOU/MM3 (ref 130–400)
PMV BLD AUTO: 9.4 FL (ref 9.4–12.4)
POTASSIUM SERPL-SCNC: 3.8 MEQ/L (ref 3.5–5.2)
PROT SERPL-MCNC: 6.1 G/DL (ref 6.1–8)
RBC # BLD AUTO: 4.62 MILL/MM3 (ref 4.7–6.1)
REASON FOR REJECTION: NORMAL
REJECTED TEST: NORMAL
RH FACTOR: NORMAL
SEGMENTED NEUTROPHILS ABSOLUTE COUNT: 16.8 THOU/MM3 (ref 1.8–7.7)
SODIUM SERPL-SCNC: 138 MEQ/L (ref 135–145)
WBC # BLD AUTO: 20.8 THOU/MM3 (ref 4.8–10.8)

## 2023-05-04 PROCEDURE — 83874 ASSAY OF MYOGLOBIN: CPT

## 2023-05-04 PROCEDURE — 94761 N-INVAS EAR/PLS OXIMETRY MLT: CPT

## 2023-05-04 PROCEDURE — 2500000003 HC RX 250 WO HCPCS

## 2023-05-04 PROCEDURE — 70553 MRI BRAIN STEM W/O & W/DYE: CPT

## 2023-05-04 PROCEDURE — 6360000004 HC RX CONTRAST MEDICATION: Performed by: SOCIAL WORKER

## 2023-05-04 PROCEDURE — 80053 COMPREHEN METABOLIC PANEL: CPT

## 2023-05-04 PROCEDURE — 86900 BLOOD TYPING SEROLOGIC ABO: CPT

## 2023-05-04 PROCEDURE — 99233 SBSQ HOSP IP/OBS HIGH 50: CPT

## 2023-05-04 PROCEDURE — 2580000003 HC RX 258

## 2023-05-04 PROCEDURE — 93010 ELECTROCARDIOGRAM REPORT: CPT | Performed by: NUCLEAR MEDICINE

## 2023-05-04 PROCEDURE — 6370000000 HC RX 637 (ALT 250 FOR IP): Performed by: PHYSICIAN ASSISTANT

## 2023-05-04 PROCEDURE — 86901 BLOOD TYPING SEROLOGIC RH(D): CPT

## 2023-05-04 PROCEDURE — 2580000003 HC RX 258: Performed by: INTERNAL MEDICINE

## 2023-05-04 PROCEDURE — 86850 RBC ANTIBODY SCREEN: CPT

## 2023-05-04 PROCEDURE — 85025 COMPLETE CBC W/AUTO DIFF WBC: CPT

## 2023-05-04 PROCEDURE — 6360000002 HC RX W HCPCS

## 2023-05-04 PROCEDURE — 1200000003 HC TELEMETRY R&B

## 2023-05-04 PROCEDURE — 6360000002 HC RX W HCPCS: Performed by: INTERNAL MEDICINE

## 2023-05-04 PROCEDURE — 6370000000 HC RX 637 (ALT 250 FOR IP)

## 2023-05-04 PROCEDURE — C1764 EVENT RECORDER, CARDIAC: HCPCS

## 2023-05-04 PROCEDURE — 36415 COLL VENOUS BLD VENIPUNCTURE: CPT

## 2023-05-04 PROCEDURE — A9579 GAD-BASE MR CONTRAST NOS,1ML: HCPCS | Performed by: SOCIAL WORKER

## 2023-05-04 PROCEDURE — 6370000000 HC RX 637 (ALT 250 FOR IP): Performed by: INTERNAL MEDICINE

## 2023-05-04 PROCEDURE — 6360000002 HC RX W HCPCS: Performed by: PHYSICIAN ASSISTANT

## 2023-05-04 PROCEDURE — 83605 ASSAY OF LACTIC ACID: CPT

## 2023-05-04 PROCEDURE — 33285 INSJ SUBQ CAR RHYTHM MNTR: CPT | Performed by: INTERNAL MEDICINE

## 2023-05-04 PROCEDURE — 0JH632Z INSERTION OF MONITORING DEVICE INTO CHEST SUBCUTANEOUS TISSUE AND FASCIA, PERCUTANEOUS APPROACH: ICD-10-PCS | Performed by: INTERNAL MEDICINE

## 2023-05-04 PROCEDURE — 82550 ASSAY OF CK (CPK): CPT

## 2023-05-04 PROCEDURE — 93005 ELECTROCARDIOGRAM TRACING: CPT | Performed by: PHYSICIAN ASSISTANT

## 2023-05-04 PROCEDURE — 85730 THROMBOPLASTIN TIME PARTIAL: CPT

## 2023-05-04 PROCEDURE — 85610 PROTHROMBIN TIME: CPT

## 2023-05-04 PROCEDURE — 33285 INSJ SUBQ CAR RHYTHM MNTR: CPT

## 2023-05-04 RX ORDER — ENOXAPARIN SODIUM 100 MG/ML
40 INJECTION SUBCUTANEOUS EVERY 24 HOURS
Status: DISCONTINUED | OUTPATIENT
Start: 2023-05-04 | End: 2023-05-06 | Stop reason: HOSPADM

## 2023-05-04 RX ORDER — HYDROXYZINE HYDROCHLORIDE 25 MG/1
25 TABLET, FILM COATED ORAL ONCE
Status: COMPLETED | OUTPATIENT
Start: 2023-05-04 | End: 2023-05-04

## 2023-05-04 RX ORDER — CHLORHEXIDINE GLUCONATE 4 G/100ML
SOLUTION TOPICAL ONCE
Status: COMPLETED | OUTPATIENT
Start: 2023-05-04 | End: 2023-05-04

## 2023-05-04 RX ORDER — LORAZEPAM 2 MG/ML
1 INJECTION INTRAMUSCULAR ONCE
Status: COMPLETED | OUTPATIENT
Start: 2023-05-04 | End: 2023-05-04

## 2023-05-04 RX ORDER — LEVETIRACETAM 100 MG/ML
500 SOLUTION ORAL 2 TIMES DAILY
Status: DISCONTINUED | OUTPATIENT
Start: 2023-05-04 | End: 2023-05-05

## 2023-05-04 RX ORDER — SODIUM CHLORIDE 0.9 % (FLUSH) 0.9 %
5-40 SYRINGE (ML) INJECTION EVERY 12 HOURS SCHEDULED
Status: DISCONTINUED | OUTPATIENT
Start: 2023-05-04 | End: 2023-05-05 | Stop reason: SDUPTHER

## 2023-05-04 RX ORDER — SODIUM CHLORIDE 9 MG/ML
INJECTION, SOLUTION INTRAVENOUS PRN
Status: DISCONTINUED | OUTPATIENT
Start: 2023-05-04 | End: 2023-05-05 | Stop reason: SDUPTHER

## 2023-05-04 RX ORDER — HYDROXYZINE HYDROCHLORIDE 25 MG/1
25 TABLET, FILM COATED ORAL 3 TIMES DAILY PRN
Status: DISCONTINUED | OUTPATIENT
Start: 2023-05-04 | End: 2023-05-06 | Stop reason: HOSPADM

## 2023-05-04 RX ORDER — SODIUM CHLORIDE 0.9 % (FLUSH) 0.9 %
5-40 SYRINGE (ML) INJECTION PRN
Status: DISCONTINUED | OUTPATIENT
Start: 2023-05-04 | End: 2023-05-05 | Stop reason: SDUPTHER

## 2023-05-04 RX ORDER — ENOXAPARIN SODIUM 100 MG/ML
40 INJECTION SUBCUTANEOUS 2 TIMES DAILY
Status: DISCONTINUED | OUTPATIENT
Start: 2023-05-04 | End: 2023-05-04 | Stop reason: DRUGHIGH

## 2023-05-04 RX ORDER — HYDROXYZINE PAMOATE 25 MG/1
25 CAPSULE ORAL 3 TIMES DAILY PRN
Status: DISCONTINUED | OUTPATIENT
Start: 2023-05-04 | End: 2023-05-04

## 2023-05-04 RX ORDER — SODIUM CHLORIDE, SODIUM LACTATE, POTASSIUM CHLORIDE, CALCIUM CHLORIDE 600; 310; 30; 20 MG/100ML; MG/100ML; MG/100ML; MG/100ML
INJECTION, SOLUTION INTRAVENOUS CONTINUOUS
Status: DISCONTINUED | OUTPATIENT
Start: 2023-05-04 | End: 2023-05-06 | Stop reason: HOSPADM

## 2023-05-04 RX ORDER — SODIUM CHLORIDE 9 MG/ML
INJECTION, SOLUTION INTRAVENOUS CONTINUOUS
Status: DISCONTINUED | OUTPATIENT
Start: 2023-05-04 | End: 2023-05-04

## 2023-05-04 RX ADMIN — HYDROXYZINE HYDROCHLORIDE 25 MG: 25 TABLET, FILM COATED ORAL at 01:55

## 2023-05-04 RX ADMIN — ONDANSETRON 4 MG: 2 INJECTION INTRAMUSCULAR; INTRAVENOUS at 06:57

## 2023-05-04 RX ADMIN — HYDROXYZINE HYDROCHLORIDE 25 MG: 25 TABLET, FILM COATED ORAL at 18:37

## 2023-05-04 RX ADMIN — GADOTERIDOL 15 ML: 279.3 INJECTION, SOLUTION INTRAVENOUS at 15:38

## 2023-05-04 RX ADMIN — ACETAMINOPHEN 650 MG: 325 TABLET ORAL at 01:54

## 2023-05-04 RX ADMIN — LEVETIRACETAM 500 MG: 500 SOLUTION ORAL at 18:36

## 2023-05-04 RX ADMIN — SODIUM CHLORIDE, POTASSIUM CHLORIDE, SODIUM LACTATE AND CALCIUM CHLORIDE: 600; 310; 30; 20 INJECTION, SOLUTION INTRAVENOUS at 11:01

## 2023-05-04 RX ADMIN — LEVETIRACETAM 500 MG: 500 TABLET, FILM COATED ORAL at 00:31

## 2023-05-04 RX ADMIN — SODIUM CHLORIDE, POTASSIUM CHLORIDE, SODIUM LACTATE AND CALCIUM CHLORIDE: 600; 310; 30; 20 INJECTION, SOLUTION INTRAVENOUS at 21:14

## 2023-05-04 RX ADMIN — SODIUM CHLORIDE, POTASSIUM CHLORIDE, SODIUM LACTATE AND CALCIUM CHLORIDE: 600; 310; 30; 20 INJECTION, SOLUTION INTRAVENOUS at 16:03

## 2023-05-04 RX ADMIN — CHLORHEXIDINE GLUCONATE: 4 SOLUTION TOPICAL at 10:15

## 2023-05-04 RX ADMIN — ONDANSETRON 4 MG: 2 INJECTION INTRAMUSCULAR; INTRAVENOUS at 14:18

## 2023-05-04 RX ADMIN — ACETAMINOPHEN 650 MG: 325 TABLET ORAL at 16:01

## 2023-05-04 RX ADMIN — SODIUM CHLORIDE, POTASSIUM CHLORIDE, SODIUM LACTATE AND CALCIUM CHLORIDE: 600; 310; 30; 20 INJECTION, SOLUTION INTRAVENOUS at 02:00

## 2023-05-04 RX ADMIN — ACETAMINOPHEN 650 MG: 325 TABLET ORAL at 21:40

## 2023-05-04 RX ADMIN — Medication 2000 MG: at 12:10

## 2023-05-04 RX ADMIN — LORAZEPAM 1 MG: 2 INJECTION INTRAMUSCULAR; INTRAVENOUS at 14:15

## 2023-05-04 RX ADMIN — ACETAMINOPHEN 650 MG: 325 TABLET ORAL at 06:57

## 2023-05-04 ASSESSMENT — PAIN - FUNCTIONAL ASSESSMENT
PAIN_FUNCTIONAL_ASSESSMENT: ACTIVITIES ARE NOT PREVENTED
PAIN_FUNCTIONAL_ASSESSMENT: ACTIVITIES ARE NOT PREVENTED

## 2023-05-04 ASSESSMENT — PAIN SCALES - GENERAL
PAINLEVEL_OUTOF10: 0
PAINLEVEL_OUTOF10: 4
PAINLEVEL_OUTOF10: 0
PAINLEVEL_OUTOF10: 3
PAINLEVEL_OUTOF10: 6
PAINLEVEL_OUTOF10: 2
PAINLEVEL_OUTOF10: 0

## 2023-05-04 ASSESSMENT — PAIN DESCRIPTION - DESCRIPTORS
DESCRIPTORS: ACHING

## 2023-05-04 ASSESSMENT — PAIN DESCRIPTION - LOCATION
LOCATION: GENERALIZED
LOCATION: HEAD
LOCATION: HEAD
LOCATION: CHEST

## 2023-05-04 ASSESSMENT — LIFESTYLE VARIABLES
HOW OFTEN DO YOU HAVE A DRINK CONTAINING ALCOHOL: NEVER
HOW MANY STANDARD DRINKS CONTAINING ALCOHOL DO YOU HAVE ON A TYPICAL DAY: PATIENT DOES NOT DRINK

## 2023-05-04 ASSESSMENT — PAIN DESCRIPTION - ORIENTATION
ORIENTATION: MID
ORIENTATION: MID

## 2023-05-04 NOTE — PROGRESS NOTES
[address] arrived in room with [employer] rep to prep patient. Patient verifed with two identifiers. Antibiotic started at this time. Consent and procedure verified and explained to pt and pt family. Pt questions and concerns answered at this time. Pt appears to be very nervous for procedure. [address] applied to incision site. 1222 Pt being prepped for procedure in a sterile manner. Dr. Dale Kidd arrived in room. 1224 Time out completed with all staff in room. 1225 10 mL of 2 % Lidocaine ordered and given by Dr. Dale Kidd. 1227 Loop implant inserted by Dr. Dale Kidd    1228 Incision site sutured by Dr. Dale Kidd. Vince M RT applying manual pressure to site. 56 Dr. Dale Kidd scrubbed out and talking to patient family at this time. 1230 Manual pressure released at this time. Vital signs stable, pt is A and O x4. Pt talking with staff. 1231 Tegaderm and 4 x4 gauze and steri strips applied to incision site. [facility] educating patient on loop device. 1234 End procedure. Report given to [address] [place] [address].
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
## 2023-05-04 NOTE — CARE COORDINATION
Case Management Assessment  Initial Evaluation    Date/Time of Evaluation: 5/4/2023 11:14 AM  Assessment Completed by: Lidia Mathew RN    If patient is discharged prior to next notation, then this note serves as note for discharge by case management. Patient Name: Prashanth Suarez                   YOB: 1995  Diagnosis: Respiratory acidosis [E87.29]  Seizure ([address] [R56.9]  Leukocytosis, unspecified type [D72.829]  Drug overdose of undetermined intent, initial encounter Alan Baptiste                   Date / Time: 5/3/2023  6:14 AM  Location: [facility]02/002-A     Patient Admission Status: Inpatient   Readmission Risk Low 0-14, Mod 15-19), High > 20: Readmission Risk Score: 5.3    Current PCP: prefers new [facility] after choices offered (see AVS)  PCP verified by CM? Yes (s/u w Residency Clinic)    Chart Reviewed: Yes      History Provided by: Significant Other, Medical Record  Patient Orientation: Alert and Oriented    Patient Cognition: Alert    Hospitalization in the last 30 days (Readmission):  No    If yes, Readmission Assessment in CM Navigator will be completed. Advance Directives:      Code Status: Full Code   Patient's Primary Decision Maker is: Legal Next of Kin      Discharge Planning:    Patient lives with: Spouse/Significant Other Type of Home: House  Primary Care Giver: Self  Patient Support Systems include: Parent, Family Members   Current Financial resources: Medicaid  Current community resources: None  Current services prior to admission: None            Current DME:  none            Type of Home Care services:  None    ADLS  Prior functional level: Independent in ADLs/IADLs  Current functional level: Independent in ADLs/IADLs    Family can provide assistance at DC: Yes  Would you like Case Management to discuss the discharge plan with any other family members/significant others, and if so, who?  No  Plans to Return to Present Housing: Yes  Other Identified Issues/Barriers

## 2023-05-04 NOTE — PROGRESS NOTES
Pharmacist Review and Automatic Dose Adjustment of Prophylactic Enoxaparin      The reviewing pharmacist has made an adjustment to the ordered enoxaparin dose or converted to UFH per the approved [facility] protocol and table as identified below. Katharine Perla is a 33 y.o. male. Recent Labs     05/03/23  0728 05/04/23  0404   CREATININE 1.3* 0.8       Estimated Creatinine Clearance: 133 mL/min (based on SCr of 0.8 mg/dL).     Height:   Ht Readings from Last 1 Encounters:   05/03/23 5' 3\" (1.6 m)     Weight:  Wt Readings from Last 1 Encounters:   05/04/23 185 lb 4.8 oz (84.1 kg)               Plan: Based upon the patient's weight and renal function, the enoxaparin dose has been changed/converted to 40 mg SQ daily      Thank you,  Sean Camargo, [address] [facility]  5/4/2023, 6:16 PM

## 2023-05-04 NOTE — PROGRESS NOTES
Daily Progress Note  Gladys Gamboa MD  5/4/2023    Reviewed patient's current plan of care and vital signs with nursing staff. Patient seen with his wife and mother. He denies feeling depressed. He has some anxiety related to a procedure he has later today. SUBJECTIVE:    Patient is feeling better. denies suicidal ideation. ROS: Patient has new complaints:  No  Sleeping adequately:  Yes      Mental Status Examination:  Patient is cooperative. Speech: Normal rate and tone. No abnormal movements, tics or mannerisms. Mood anxious; affect normal affect. Suicidal ideation Absent. Homicidal ideations Absent. Hallucinations Absent. Delusions Absent. Thought Content: normal. Thought Processes: Goal-Directed. Alert and oriented X 3. Attention and concentration fair. MEMORY intact. Insight and Judgement Fair.       Medications  Current Facility-Administered Medications: 0.9 % sodium chloride infusion, , IntraVENous, Continuous  sodium chloride flush 0.9 % injection 5-40 mL, 5-40 mL, IntraVENous, 2 times per day  sodium chloride flush 0.9 % injection 5-40 mL, 5-40 mL, IntraVENous, PRN  0.9 % sodium chloride infusion, , IntraVENous, PRN  ceFAZolin (ANCEF) 2000 mg in 0.9% sodium chloride 50 mL IVPB, 2,000 mg, IntraVENous, On Call to OR  chlorhexidine (HIBICLENS) 4 % liquid, , Topical, Once  lactated ringers IV soln infusion, , IntraVENous, Continuous  LORazepam (ATIVAN) injection 1 mg, 1 mg, IntraVENous, Once  naloxone (NARCAN) injection 1 mg, 1 mg, IntraVENous, PRN  naloxone (NARCAN) injection 1 mg, 1 mg, IntraVENous, PRN  LORazepam (ATIVAN) injection 1 mg, 1 mg, IntraVENous, Q5 Min PRN  levETIRAcetam (KEPPRA) tablet 500 mg, 500 mg, Oral, Q12H  potassium chloride (KLOR-CON M) extended release tablet 40 mEq, 40 mEq, Oral, PRN **OR** potassium bicarb-citric acid (EFFER-K) effervescent tablet 40 mEq, 40 mEq, Oral, PRN **OR** potassium chloride 10 mEq/100 mL IVPB (Peripheral Line), 10 mEq, IntraVENous, PRN  magnesium

## 2023-05-04 NOTE — DISCHARGE INSTRUCTIONS
Follow up Dr Jamie Evangelista (cardiology) 2 weeks  Follow up with Dr. Azalea Miles (neurology) in 1-2 weeks for further evaluation of your seizures and numbness in your fingers  Follow up with Dr. Morenita Rose (psychiatry) as needed for anxiety. You may take Atarax as needed to help with this. Follow up with Dr. Jose Bennett and the device clinic within 1 week upon discharge for further evaluation of your loop recording device. Please call to schedule an appointment with a primary care doctor and follow up within 1 week upon discharge for post-hospital follow up and in regards to your fingers. Please DO NOT smoke marijuana, intake edibles, or consume anything with cannabinoids while you have the loop recorder in place. Please do not use any other illicit substances.

## 2023-05-04 NOTE — PROGRESS NOTES
Patient in stable condition transferred to [address] by wheelchair with belongings and chart. Report called to [address] [place] [address] nurse. Family with patient.

## 2023-05-04 NOTE — PROCEDURES
[facility]  Dept: [phone]  CARDIAC ELECTROPHYSIOLOGY: PROCEDURE NOTE  Patients Demographics:  Date:   5/4/2023  Patient name:              Pieter Ramos  YOB: 1995  Sex: male   MRN:   [id number]    Primary Care Provider:    No primary care provider on file. Cardiologist:   Yuliet Myers MD    Procedure Planned:  Loop recorder implantation. Indication for Procedure:   Recurrent syncope     Procedure/s Performed:   Loop recorder implantation. Description of the Procedure: The procedure was performed bed side. The patient was in A fasting and non-sedated state. He was prepped and draped in the usual sterile fashion. Lidocaine, 2% was injected into the left parasternal area in the region of the fourth intercostal space for local anesthesia. Using the provided insertion tool, 1 cm incision was mode in skin and subcutaneous tunnel created. The device was then injected into the subcutaneous pocket using company provided tool. The incision was then closed with 4-0 vicryl suture and site covered with steri-strips and a light pressure dressing. The device was checked and good atrial and ventricular electrograms visualized. The patient tolerated the procedure well. Medications:  Cefazolin 2 gm intravenously. Lidocaine/Marcaine 10  cc ID/SQ    Blood Loss:  Minimal.    Complications:  None. Device Parameters:  [employer] [facility] ILR, model [id number], serial [id number]. Position:  Left parasternal area. Measured R wave: 0.80 mV  Programmed sensitivity:  0.035 millivolts    Device Settings:  Tachycardia, 200 beats per minute for 16 beats. Bradycardia, 40 beats per minute for 4 beats. Pauses more than 3 seconds. AT/AF:  ON. Summary:  Successful loop recorder implantation. Recommendations: Follow-up in device clinic in 1 week.   Tylenol 625 mg every 6 hours as needed for

## 2023-05-04 NOTE — PRE SEDATION
Sedation Pre-Procedure Note    Patient Name: Katharine Perla   YOB: 1995  Room/Bed: [facility]011-A  Medical Record Number: [id number]  Date: 5/4/2023   Time: 12:33 PM       Indication:  Lop implant    Consent: I have discussed with the patient and/or the patient representative the indication, alternatives, and the possible risks and/or complications of the planned procedure and the anesthesia methods. The patient and/or patient representative appear to understand and agree to proceed. Vital Signs:   Vitals:    05/04/23 1230   BP: (!) 124/58   Pulse: 69   Resp: 18   Temp:    SpO2: 100%       Past Medical History:   has a past medical history of Seizures ([facility] [address] Past Surgical History:   has no past surgical history on file. Medications:   Scheduled Meds:    sodium chloride flush  5-40 mL IntraVENous 2 times per day    ceFAZolin (ANCEF) IVPB  2,000 mg IntraVENous On Call to OR    chlorhexidine   Topical Once    LORazepam  1 mg IntraVENous Once    levETIRAcetam  500 mg Oral Q12H    sodium chloride flush  5-40 mL IntraVENous 2 times per day     Continuous Infusions:    sodium chloride      sodium chloride      lactated ringers IV soln 200 mL/hr at 05/04/23 1101    sodium chloride       PRN Meds: sodium chloride flush, sodium chloride, naloxone, naloxone, LORazepam, potassium chloride **OR** potassium alternative oral replacement **OR** potassium chloride, magnesium sulfate, sodium chloride flush, sodium chloride, acetaminophen, ondansetron  Home Meds:   Prior to Admission medications    Not on File     Coumadin Use Last 7 Days:  no  Antiplatelet drug therapy use last 7 days: no  Other anticoagulant use last 7 days: no  Additional Medication Information:  Per medical records. Pre-Sedation Documentation and Exam:   I have personally completed a history, physical exam & review of systems for this patient (see notes).     Mallampati Airway Assessment:  Mallampati Class III - (soft palate & base of

## 2023-05-04 NOTE — FLOWSHEET NOTE
05/04/23 0953   Safe Environment   Safety Measures Bed in low position;Call light within reach; Fall prevention (comment); Family at bedside;Side rails X 2;Standard Safety Measures  (Virtual nurse safety round completed.)     Patient is awake and  alert and answers questions. Referred patient to page 13 of the handbook for fall prevention review. Call light in reach.

## 2023-05-04 NOTE — PROGRESS NOTES
Hospitalist Progress Note    Patient:  Osito Mitchell      Unit/Bed:8A-11/011-A    YOB: 1995    MRN: [id number]       Acct: [de-identified]     PCP: No primary care provider on file. Date of Admission: 5/3/2023    Assessment/Plan:    Syncope with LOC > suspected due to accidental fentanyl overdose  Patient endorsed history of seizures in the past but is not on maintenance medications and does not follow with neurology. He reports smoking marijuana before his shift. 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 Per chart review pt received Narcan per EMS and was started on narcan gtt in the ED. Pt denies intentional overdose of SI/HI. States he does not use fentanyl and was not experimenting w/ it recreationally. ? Possibly laced marijuana? CXR normal  CT head negative for acute abnormality. Normal appearance of the brain  MRI normal appearance of the brain. EEG normal  Initial ECG notable for NSR, incomplete RBBB with ST elevations in anteroseptal leads. Repeat ECG now showing NSR with increased T wave amplitude in lateral leads. See cardiac workup and tx in #7  Orthostatic VS ordered  UDS positive for fentanyl and cannabinoids  S/p Narcan drip  Psychiatry was initially consulted (POA) given unknown intent. Patient denies SI/HI. Bedside sitter discontinued per psych  Acute hypercapnic respiratory failure, resolved  Found to be severely hypercapnic on admission, with profound respiratory acidosis of pH 6.88, pCO2 110, PO2 33, HCO3 21. Received narcan PTA via EMS and was started on Narcan gtt. Since has been weaned off and has improved VBG w/ pH7.42, PCO2 31, PO2 47, HCO3 20. Satting appropriately on RA. A&O x 4. Weaned of Narcan gtt now. ROSEMARIE, improving:   Secondary to renal hypoperfusion from being found down and likely prerenal causes.   Creatinine down trended to normal, was 1.3 (POA)  Continuous IVF for

## 2023-05-04 NOTE — PROGRESS NOTES
Pt was sleepy but could not talk to me. He was bless   05/04/23 1441   Encounter Summary   Encounter Overview/Reason  Initial Encounter   Service Provided For: Patient   Referral/Consult From: Irvin   Last Encounter  05/04/23   Complexity of Encounter Low   Begin Time 1240   End Time  1243   Total Time Calculated 3 min   Spiritual/Emotional needs   Type Spiritual Support     ed.

## 2023-05-04 NOTE — PROGRESS NOTES
Pt agreeable to go to MRI. Pt went to MRI via transport, upon arriving pt stated that he could not complete the test and felt sick. Pt states he wants to be left alone to sleep tonight, and would like to try again tomorrow with medication to relax as pt is claustrophobic.  Electronically signed by Harmeet Pedersen RN on 5/3/2023 at 8:39 PM

## 2023-05-05 LAB
ANION GAP SERPL CALC-SCNC: 12 MEQ/L (ref 8–16)
BACTERIA BLD AEROBE CULT: NORMAL
BACTERIA BLD AEROBE CULT: NORMAL
BASOPHILS ABSOLUTE: 0 THOU/MM3 (ref 0–0.1)
BASOPHILS NFR BLD AUTO: 0.2 %
BUN SERPL-MCNC: 8 MG/DL (ref 7–22)
CALCIUM SERPL-MCNC: 8.8 MG/DL (ref 8.5–10.5)
CHLORIDE SERPL-SCNC: 106 MEQ/L (ref 98–111)
CK SERPL-CCNC: 703 U/L (ref 55–170)
CK SERPL-CCNC: 748 U/L (ref 55–170)
CO2 SERPL-SCNC: 27 MEQ/L (ref 23–33)
CREAT SERPL-MCNC: 0.8 MG/DL (ref 0.4–1.2)
DEPRECATED RDW RBC AUTO: 40.2 FL (ref 35–45)
EOSINOPHIL NFR BLD AUTO: 0.3 %
EOSINOPHILS ABSOLUTE: 0 THOU/MM3 (ref 0–0.4)
ERYTHROCYTE [DISTWIDTH] IN BLOOD BY AUTOMATED COUNT: 12.2 % (ref 11.5–14.5)
GFR SERPL CREATININE-BSD FRML MDRD: > 60 ML/MIN/1.73M2
GLUCOSE SERPL-MCNC: 82 MG/DL (ref 70–108)
HCT VFR BLD AUTO: 39.7 % (ref 42–52)
HGB BLD-MCNC: 13.8 GM/DL (ref 14–18)
IMM GRANULOCYTES # BLD AUTO: 0.03 THOU/MM3 (ref 0–0.07)
IMM GRANULOCYTES NFR BLD AUTO: 0.3 %
LYMPHOCYTES ABSOLUTE: 2 THOU/MM3 (ref 1–4.8)
LYMPHOCYTES NFR BLD AUTO: 22 %
MCH RBC QN AUTO: 31.5 PG (ref 26–33)
MCHC RBC AUTO-ENTMCNC: 34.8 GM/DL (ref 32.2–35.5)
MCV RBC AUTO: 90.6 FL (ref 80–94)
MONOCYTES ABSOLUTE: 0.8 THOU/MM3 (ref 0.4–1.3)
MONOCYTES NFR BLD AUTO: 8.3 %
NEUTROPHILS NFR BLD AUTO: 68.9 %
NRBC BLD AUTO-RTO: 0 /100 WBC
PLATELET # BLD AUTO: 180 THOU/MM3 (ref 130–400)
PMV BLD AUTO: 9.3 FL (ref 9.4–12.4)
POTASSIUM SERPL-SCNC: 4.6 MEQ/L (ref 3.5–5.2)
RBC # BLD AUTO: 4.38 MILL/MM3 (ref 4.7–6.1)
SEGMENTED NEUTROPHILS ABSOLUTE COUNT: 6.3 THOU/MM3 (ref 1.8–7.7)
SODIUM SERPL-SCNC: 145 MEQ/L (ref 135–145)
WBC # BLD AUTO: 9.1 THOU/MM3 (ref 4.8–10.8)

## 2023-05-05 PROCEDURE — 6370000000 HC RX 637 (ALT 250 FOR IP): Performed by: INTERNAL MEDICINE

## 2023-05-05 PROCEDURE — 80048 BASIC METABOLIC PNL TOTAL CA: CPT

## 2023-05-05 PROCEDURE — 1200000003 HC TELEMETRY R&B

## 2023-05-05 PROCEDURE — 2580000003 HC RX 258

## 2023-05-05 PROCEDURE — 85025 COMPLETE CBC W/AUTO DIFF WBC: CPT

## 2023-05-05 PROCEDURE — 99232 SBSQ HOSP IP/OBS MODERATE 35: CPT

## 2023-05-05 PROCEDURE — 36415 COLL VENOUS BLD VENIPUNCTURE: CPT

## 2023-05-05 PROCEDURE — 82550 ASSAY OF CK (CPK): CPT

## 2023-05-05 PROCEDURE — 6370000000 HC RX 637 (ALT 250 FOR IP)

## 2023-05-05 RX ADMIN — SODIUM CHLORIDE, POTASSIUM CHLORIDE, SODIUM LACTATE AND CALCIUM CHLORIDE: 600; 310; 30; 20 INJECTION, SOLUTION INTRAVENOUS at 20:03

## 2023-05-05 RX ADMIN — SODIUM CHLORIDE, POTASSIUM CHLORIDE, SODIUM LACTATE AND CALCIUM CHLORIDE: 600; 310; 30; 20 INJECTION, SOLUTION INTRAVENOUS at 06:19

## 2023-05-05 RX ADMIN — SODIUM CHLORIDE, POTASSIUM CHLORIDE, SODIUM LACTATE AND CALCIUM CHLORIDE: 600; 310; 30; 20 INJECTION, SOLUTION INTRAVENOUS at 16:56

## 2023-05-05 RX ADMIN — HYDROXYZINE HYDROCHLORIDE 25 MG: 25 TABLET, FILM COATED ORAL at 21:40

## 2023-05-05 RX ADMIN — SODIUM CHLORIDE, POTASSIUM CHLORIDE, SODIUM LACTATE AND CALCIUM CHLORIDE: 600; 310; 30; 20 INJECTION, SOLUTION INTRAVENOUS at 02:17

## 2023-05-05 RX ADMIN — SODIUM CHLORIDE, POTASSIUM CHLORIDE, SODIUM LACTATE AND CALCIUM CHLORIDE: 600; 310; 30; 20 INJECTION, SOLUTION INTRAVENOUS at 11:45

## 2023-05-05 RX ADMIN — LEVETIRACETAM 500 MG: 500 SOLUTION ORAL at 09:22

## 2023-05-05 RX ADMIN — ACETAMINOPHEN 650 MG: 325 TABLET ORAL at 02:17

## 2023-05-05 ASSESSMENT — PAIN SCALES - GENERAL: PAINLEVEL_OUTOF10: 5

## 2023-05-05 ASSESSMENT — PAIN DESCRIPTION - DESCRIPTORS: DESCRIPTORS: ACHING

## 2023-05-05 ASSESSMENT — PAIN DESCRIPTION - LOCATION: LOCATION: HEAD

## 2023-05-06 VITALS
HEIGHT: 63 IN | TEMPERATURE: 98.6 F | OXYGEN SATURATION: 100 % | SYSTOLIC BLOOD PRESSURE: 129 MMHG | DIASTOLIC BLOOD PRESSURE: 69 MMHG | HEART RATE: 60 BPM | BODY MASS INDEX: 33.13 KG/M2 | WEIGHT: 187 LBS | RESPIRATION RATE: 16 BRPM

## 2023-05-06 LAB
ANION GAP SERPL CALC-SCNC: 8 MEQ/L (ref 8–16)
BACTERIA SPEC AEROBE CULT: NORMAL
BASOPHILS ABSOLUTE: 0 THOU/MM3 (ref 0–0.1)
BASOPHILS NFR BLD AUTO: 0.2 %
BUN SERPL-MCNC: 7 MG/DL (ref 7–22)
CALCIUM SERPL-MCNC: 8.9 MG/DL (ref 8.5–10.5)
CHLORIDE SERPL-SCNC: 106 MEQ/L (ref 98–111)
CK SERPL-CCNC: 464 U/L (ref 55–170)
CO2 SERPL-SCNC: 29 MEQ/L (ref 23–33)
CREAT SERPL-MCNC: 0.8 MG/DL (ref 0.4–1.2)
DEPRECATED RDW RBC AUTO: 38.5 FL (ref 35–45)
EOSINOPHIL NFR BLD AUTO: 1.5 %
EOSINOPHILS ABSOLUTE: 0.1 THOU/MM3 (ref 0–0.4)
ERYTHROCYTE [DISTWIDTH] IN BLOOD BY AUTOMATED COUNT: 11.9 % (ref 11.5–14.5)
GFR SERPL CREATININE-BSD FRML MDRD: > 60 ML/MIN/1.73M2
GLUCOSE SERPL-MCNC: 88 MG/DL (ref 70–108)
HCT VFR BLD AUTO: 41.6 % (ref 42–52)
HGB BLD-MCNC: 14.4 GM/DL (ref 14–18)
IMM GRANULOCYTES # BLD AUTO: 0.03 THOU/MM3 (ref 0–0.07)
IMM GRANULOCYTES NFR BLD AUTO: 0.4 %
LYMPHOCYTES ABSOLUTE: 2.2 THOU/MM3 (ref 1–4.8)
LYMPHOCYTES NFR BLD AUTO: 27.1 %
MCH RBC QN AUTO: 30.9 PG (ref 26–33)
MCHC RBC AUTO-ENTMCNC: 34.6 GM/DL (ref 32.2–35.5)
MCV RBC AUTO: 89.3 FL (ref 80–94)
MONOCYTES ABSOLUTE: 0.6 THOU/MM3 (ref 0.4–1.3)
MONOCYTES NFR BLD AUTO: 8.1 %
NEUTROPHILS NFR BLD AUTO: 62.7 %
NRBC BLD AUTO-RTO: 0 /100 WBC
PLATELET # BLD AUTO: 181 THOU/MM3 (ref 130–400)
PMV BLD AUTO: 8.9 FL (ref 9.4–12.4)
POTASSIUM SERPL-SCNC: 3.7 MEQ/L (ref 3.5–5.2)
PYRIDOXAL PHOS SERPL-SCNC: 52.5 NMOL/L (ref 20–125)
RBC # BLD AUTO: 4.66 MILL/MM3 (ref 4.7–6.1)
SEGMENTED NEUTROPHILS ABSOLUTE COUNT: 5 THOU/MM3 (ref 1.8–7.7)
SODIUM SERPL-SCNC: 143 MEQ/L (ref 135–145)
WBC # BLD AUTO: 8 THOU/MM3 (ref 4.8–10.8)

## 2023-05-06 PROCEDURE — 85025 COMPLETE CBC W/AUTO DIFF WBC: CPT

## 2023-05-06 PROCEDURE — 80048 BASIC METABOLIC PNL TOTAL CA: CPT

## 2023-05-06 PROCEDURE — 36415 COLL VENOUS BLD VENIPUNCTURE: CPT

## 2023-05-06 PROCEDURE — 82550 ASSAY OF CK (CPK): CPT

## 2023-05-06 PROCEDURE — 2580000003 HC RX 258

## 2023-05-06 PROCEDURE — 99239 HOSP IP/OBS DSCHRG MGMT >30: CPT

## 2023-05-06 RX ORDER — HYDROXYZINE HYDROCHLORIDE 25 MG/1
25 TABLET, FILM COATED ORAL 3 TIMES DAILY PRN
Qty: 30 TABLET | Refills: 0 | Status: SHIPPED | OUTPATIENT
Start: 2023-05-06 | End: 2023-05-16

## 2023-05-06 RX ORDER — HYDROXYZINE HYDROCHLORIDE 25 MG/1
25 TABLET, FILM COATED ORAL 3 TIMES DAILY PRN
Qty: 30 TABLET | Refills: 0 | Status: SHIPPED | OUTPATIENT
Start: 2023-05-06 | End: 2023-05-06 | Stop reason: SDUPTHER

## 2023-05-06 RX ADMIN — SODIUM CHLORIDE, POTASSIUM CHLORIDE, SODIUM LACTATE AND CALCIUM CHLORIDE: 600; 310; 30; 20 INJECTION, SOLUTION INTRAVENOUS at 05:41

## 2023-05-06 RX ADMIN — SODIUM CHLORIDE, POTASSIUM CHLORIDE, SODIUM LACTATE AND CALCIUM CHLORIDE: 600; 310; 30; 20 INJECTION, SOLUTION INTRAVENOUS at 10:35

## 2023-05-06 RX ADMIN — SODIUM CHLORIDE, POTASSIUM CHLORIDE, SODIUM LACTATE AND CALCIUM CHLORIDE: 600; 310; 30; 20 INJECTION, SOLUTION INTRAVENOUS at 00:21

## 2023-05-06 NOTE — DISCHARGE SUMMARY
Hospital Medicine Discharge Summary      Patient Identification:   Merary Nicolas   : 1995  MRN: [id number]   Account: [de-identified]      Patient's PCP: No primary care provider on file. Admit Date: 5/3/2023     Discharge Date:   23     Admitting Physician: Rene Campbell MD     Discharging Nurse Practitioner: Paula Flowers APRN - CNP     Discharge Diagnoses with Assessment/Plan:    Syncope with LOC > suspected due to accidental fentanyl overdose  Patient endorsed history of seizures in the past but is not on maintenance medications and does not follow with neurology. He reports smoking marijuana before his shift. Reports he was cooking some food at work at a [employer], and does not recall what happened until waking up in ED. Per chart review pt received Narcan per EMS and was started on narcan gtt in the ED. Pt denies intentional overdose of SI/HI. States he does not use fentanyl and was not experimenting w/ it recreationally. ? Possibly laced marijuana? CXR normal  CT head negative for acute abnormality. Normal appearance of the brain  MRI normal appearance of the brain. EEG normal  Initial ECG notable for NSR, incomplete RBBB with ST elevations in anteroseptal leads. Repeat ECG now showing NSR with increased T wave amplitude in lateral leads. See cardiac workup and tx in #7  UDS positive for fentanyl and cannabinoids  S/p Narcan drip  Psychiatry was initially consulted (POA) given unknown intent. Patient denies SI/HI. Bedside sitter discontinued per psych. Acute hypercapnic respiratory failure, resolved  Found to be severely hypercapnic on admission, with profound respiratory acidosis of pH 6.88, pCO2 110, PO2 33, HCO3 21. Received narcan PTA via EMS and was started on Narcan gtt. Since has been weaned off and has improved VBG w/ pH7.42, PCO2 31, PO2 47, HCO3 20. Satting appropriately on RA. A&O x 4. Weaned of Narcan gtt now.   ROSEMARIE, resolved:   Secondary to renal hypoperfusion

## 2023-05-06 NOTE — PROGRESS NOTES
Hospitalist Progress Note    Patient:  Bekah Byrnes      Unit/Bed:8A-11/011-A    YOB: 1995    MRN: [id number]       Acct: [de-identified]     PCP: No primary care provider on file. Date of Admission: 5/3/2023    Assessment/Plan:    Syncope with LOC > suspected due to accidental fentanyl overdose  Patient endorsed history of seizures in the past but is not on maintenance medications and does not follow with neurology. He reports smoking marijuana before his shift. Reports he was cooking some food at work at a [employer], and does not recall what happened until waking up in ED. Per chart review pt received Narcan per EMS and was started on narcan gtt in the ED. Pt denies intentional overdose of SI/HI. States he does not use fentanyl and was not experimenting w/ it recreationally. ? Possibly laced marijuana? CXR normal  CT head negative for acute abnormality. Normal appearance of the brain  MRI normal appearance of the brain. EEG normal  Initial ECG notable for NSR, incomplete RBBB with ST elevations in anteroseptal leads. Repeat ECG now showing NSR with increased T wave amplitude in lateral leads. See cardiac workup and tx in #7  Orthostatic VS ordered  UDS positive for fentanyl and cannabinoids  S/p Narcan drip  Psychiatry was initially consulted (POA) given unknown intent. Patient denies SI/HI. Bedside sitter discontinued per psych  Acute hypercapnic respiratory failure, resolved  Found to be severely hypercapnic on admission, with profound respiratory acidosis of pH 6.88, pCO2 110, PO2 33, HCO3 21. Received narcan PTA via EMS and was started on Narcan gtt. Since has been weaned off and has improved VBG w/ pH7.42, PCO2 31, PO2 47, HCO3 20. Satting appropriately on RA. A&O x 4. Weaned of Narcan gtt now. ROSEMARIE, resolved:   Secondary to renal hypoperfusion from being found down and likely prerenal causes.   Creatinine down trended to normal, was 1.3 (POA)  Continuous IVF for

## 2023-05-08 ENCOUNTER — HOSPITAL ENCOUNTER (EMERGENCY)
Age: 28
Discharge: HOME | End: 2023-05-08
Payer: COMMERCIAL

## 2023-05-08 VITALS
HEART RATE: 83 BPM | WEIGHT: 187 LBS | OXYGEN SATURATION: 100 % | SYSTOLIC BLOOD PRESSURE: 133 MMHG | RESPIRATION RATE: 18 BRPM | TEMPERATURE: 98.4 F | DIASTOLIC BLOOD PRESSURE: 73 MMHG | BODY MASS INDEX: 31.92 KG/M2 | HEIGHT: 64 IN

## 2023-05-08 DIAGNOSIS — S30.22XA CONTUSION OF SCROTUM, INITIAL ENCOUNTER: Primary | ICD-10-CM

## 2023-05-08 LAB
BACTERIA BLD AEROBE CULT: NORMAL
BACTERIA BLD AEROBE CULT: NORMAL

## 2023-05-08 PROCEDURE — 99282 EMERGENCY DEPT VISIT SF MDM: CPT

## 2023-05-08 ASSESSMENT — PAIN - FUNCTIONAL ASSESSMENT: PAIN_FUNCTIONAL_ASSESSMENT: NONE - DENIES PAIN

## 2023-05-08 NOTE — ED PROVIDER NOTES
motion and neck supple. Skin:     General: Skin is warm and dry. Coloration: Skin is not pale. Findings: No erythema or rash. Neurological:      General: No focal deficit present. Mental Status: He is alert and oriented to person, place, and time. Psychiatric:         Behavior: Behavior normal.         Thought Content: Thought content normal.         Judgment: Judgment normal.       DIFFERENTIAL DIAGNOSIS:   Scrotal hematoma, scrotal ecchymosis, low suspicion of pseudoaneurysm  DIAGNOSTIC RESULTS         RADIOLOGY: non-plainfilm images(s) such as CT, Ultrasound and MRI are read by the radiologist.  Plain radiographic images are visualized and preliminarily interpreted by the emergency physician unless otherwise stated below. No orders to display         LABS:   Labs Reviewed - No data to display    EMERGENCY DEPARTMENT COURSE:   Vitals:    Vitals:    05/08/23 1419   BP: 133/73   Pulse: 83   Resp: 18   Temp: 98.4 °F (36.9 °C)   TempSrc: Oral   SpO2: 100%   Weight: 187 lb (84.8 kg)   Height: 5' 4\" (1.626 m)       MDM    Patient was seen and evaluated in the emergency department, patient appeared to be in no acute distress, vital signs reviewed, no significant findings noted. Physical exam is completed, there is some ecchymosis to the scrotum, there is no significant tenderness to the scrotum, there is some right inguinal tenderness where cardiac catheter was completed. At this point patient appears to be stable, no significant findings on exam I do not feel further imaging needed at this time. He is advised to return to the ER with worsening pain, swelling, he is advised to wear tight fitting underwear, and apply ice. He verbalized understanding of plan of care. Medications - No data to display    Patient was seenindependently by myself. The patient's final impression and disposition and plan was determined by myself.      CRITICAL CARE:   None    CONSULTS:  None    PROCEDURES:  None    FINAL

## 2023-05-08 NOTE — ED TRIAGE NOTES
Pt presents to the ER with c/o groin swelling and bruising. Pt denies injury. Pt states he did have a heart cath done on Friday where they went through his groin. Pt denies pain.  Pt is alert and oriented, vss

## 2023-05-10 LAB
EKG ATRIAL RATE: 58 BPM
EKG P AXIS: 44 DEGREES
EKG P-R INTERVAL: 132 MS
EKG Q-T INTERVAL: 396 MS
EKG QRS DURATION: 90 MS
EKG QTC CALCULATION (BAZETT): 388 MS
EKG R AXIS: 41 DEGREES
EKG T AXIS: 39 DEGREES
EKG VENTRICULAR RATE: 58 BPM

## 2023-05-10 NOTE — ED PROVIDER NOTES
[facility]       Chief Complaint   Patient presents with    Other       Nurses Notes reviewed and I agree except as noted in the HPI. HISTORY OF PRESENT ILLNESS    Carlie Snyder is a 33 y.o. male presented by EMS for depressed mentation evaluation, patient found at work unresponsive with agonal respiration, EMS gave patient Narcan intranasally with some improvement in his respiration, patient still having difficulty breathing, nasal trumpet placed and patient transported to ED. 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 He has no known history of drug use except for social marijuana use. He has history of seizures, but does not take any antiseizure medications. Onset: Acute  Duration: Unknown  Timing:   Location of Pain:   Intesity/severity: Severe respiratory distress  Modifying Factors: Severe depressed mentation/respiratory distress  Relieved by;  Previous Episodes; Tx Before arrival: Narcan  REVIEW OF SYSTEMS         PAST MEDICAL HISTORY    has a past medical history of Seizures ([facility] [address] SURGICAL HISTORY      has no past surgical history on file. CURRENT MEDICATIONS       Discharge Medication List as of 5/6/2023 11:52 AM          ALLERGIES     has No Known Allergies. FAMILY HISTORY     He indicated that his mother is alive. He indicated that his father is alive. He indicated that his sister is alive. He indicated that his brother is alive. family history includes Heart Attack in his father; Heart Disease in his sister; No Known Problems in his brother and mother. SOCIAL HISTORY      reports that he has never smoked. He has never used smokeless tobacco. He reports current drug use. Frequency: 1.00 time per week. Drug: Marijuana Daduane Townsend).  He reports that he does not drink
Exc, Mixed -4.7 (*)     All other components within normal limits   HEPATIC FUNCTION PANEL - Abnormal; Notable for the following components:    Albumin 5.2 (*)     Total Bilirubin 0.2 (*)     All other components within normal limits   TROPONIN - Abnormal; Notable for the following components:    Troponin T 0.083 (*)     All other components within normal limits   MAGNESIUM - Abnormal; Notable for the following components:    Magnesium 2.7 (*)     All other components within normal limits   PHOSPHORUS - Abnormal; Notable for the following components:    Phosphorus 5.1 (*)     All other components within normal limits   BLOOD GAS, VENOUS - Abnormal; Notable for the following components:    PH MIXED 7.42 (*)     PCO2, MIXED VENOUS 31 (*)     PO2, Mixed 47 (*)     HCO3, Mixed 20 (*)     Base Exc, Mixed -3.7 (*)     All other components within normal limits   TROPONIN - Abnormal; Notable for the following components:    Troponin T 0.080 (*)     All other components within normal limits   TROPONIN - Abnormal; Notable for the following components:    Troponin T 0.044 (*)     All other components within normal limits   LACTATE, SEPSIS - Abnormal; Notable for the following components:    Lactic Acid, Sepsis 5.1 (*)     All other components within normal limits   POCT GLUCOSE - Abnormal; Notable for the following components:    POC Glucose 225 (*)     All other components within normal limits   MRSA BY PCR   VRE SCREEN BY PCR   CULTURE, BLOOD 1   CULTURE, BLOOD 2   CULTURE, AEROBIC    Narrative:     Source: other source       Site: groin/axilla swab          Current Antibiotics: not stated   SPECIMEN REJECTION   ACETAMINOPHEN LEVEL   ETHANOL   RAPID DRUG SCREEN, URINE   GLOMERULAR FILTRATION RATE, ESTIMATED   OSMOLALITY   SCAN OF BLOOD SMEAR   BETA-HYDROXYBUTYRATE   HEMOGLOBIN A1C   SEDIMENTATION RATE   C-REACTIVE PROTEIN   TSH WITH REFLEX   PROLACTIN   AMMONIA   SPECIMEN REJECTION   VITAMIN B12 & FOLATE   BLOOD GAS, VENOUS

## 2023-05-11 ENCOUNTER — NURSE ONLY (OUTPATIENT)
Dept: CARDIOLOGY CLINIC | Age: 28
End: 2023-05-11

## 2023-05-11 DIAGNOSIS — R55 SYNCOPE, UNSPECIFIED SYNCOPE TYPE: Primary | ICD-10-CM

## 2023-05-11 NOTE — PROGRESS NOTES
In [address] - has wong on phone  Patient of Pooja    History of syncope    Battery okay    Episodes:none    Dressing removed from incision. Stitch trimmed. Incision cleaned and new steri strips applied. Reviewed site care and home monitoring with patient.

## 2023-05-12 ENCOUNTER — OFFICE VISIT (OUTPATIENT)
Dept: FAMILY MEDICINE CLINIC | Age: 28
End: 2023-05-12

## 2023-05-12 VITALS
BODY MASS INDEX: 32.06 KG/M2 | RESPIRATION RATE: 16 BRPM | WEIGHT: 187.8 LBS | DIASTOLIC BLOOD PRESSURE: 62 MMHG | OXYGEN SATURATION: 98 % | TEMPERATURE: 97.7 F | SYSTOLIC BLOOD PRESSURE: 110 MMHG | HEIGHT: 64 IN | HEART RATE: 83 BPM

## 2023-05-12 DIAGNOSIS — Z09 HOSPITAL DISCHARGE FOLLOW-UP: Primary | ICD-10-CM

## 2023-05-12 DIAGNOSIS — F41.8 SITUATIONAL ANXIETY: ICD-10-CM

## 2023-05-12 DIAGNOSIS — R56.9 SEIZURE (HCC): ICD-10-CM

## 2023-05-12 DIAGNOSIS — S30.22XA CONTUSION OF SCROTUM, INITIAL ENCOUNTER: ICD-10-CM

## 2023-05-12 DIAGNOSIS — G56.22 IMPINGEMENT OF LEFT ULNAR NERVE: ICD-10-CM

## 2023-05-12 ASSESSMENT — PATIENT HEALTH QUESTIONNAIRE - PHQ9
SUM OF ALL RESPONSES TO PHQ QUESTIONS 1-9: 0
SUM OF ALL RESPONSES TO PHQ QUESTIONS 1-9: 0
2. FEELING DOWN, DEPRESSED OR HOPELESS: 0
SUM OF ALL RESPONSES TO PHQ QUESTIONS 1-9: 0
SUM OF ALL RESPONSES TO PHQ QUESTIONS 1-9: 0
SUM OF ALL RESPONSES TO PHQ9 QUESTIONS 1 & 2: 0
1. LITTLE INTEREST OR PLEASURE IN DOING THINGS: 0

## 2023-05-12 ASSESSMENT — ENCOUNTER SYMPTOMS
DIARRHEA: 0
VOMITING: 0
NAUSEA: 0

## 2023-05-12 NOTE — PROGRESS NOTES
[address] [facility] [address] [address]  Dept: [phone]  Dept Fax: [phone]: [phone]      Assessment & Plan     1. Impingement of left ulnar nerve    2. Seizure ([address]       No orders of the defined types were placed in this encounter. There are no Patient Instructions on file for this visit. No follow-ups on file. Future Appointments   Date Time Provider Ginger Cr   6/2/2023  8:00 AM Xiao Campos MD [address] [facility]     History of Present Illness   Reason for Appointment:   Chief Complaint   Patient presents with    Follow-Up from Hospital     Wants to establish care also  [facility] f/u for accidental OD , abnormal EKG, clean cath     Hipolito Cat is a 33 y.o. male who presents today for:    Hospital follow-up. Today the patient comes in improved since hospital admission. He reports two findings since admission including left medial forearm and left 4th and 5th digit tingling/shooting likely ulnar nerve impingement that began while in the hospital. It starts at medial elbow to finger tips. While he was in the hospital he reports it was cool to the touch but that has resolved. Denies pain, swelling, or loss of sensation. Denies recent trauma to left arm. Denies laying on left arm while sleeping. Denies head motion causing the pain. He is requesting a referral for neurology for further workup. His other complaint since discharge is groin swelling since 5/8 that has improved. Scrotum is not edematous or tender to palpation. Denies associated penis or testicular tenderness. No issues urinating or hematuria. Given the recent events, he admits to needing hydroxyzine PRN 3x daily. Denies medication side effects. Unsure if its truly working. Previous History:  Heart Attack in his father; Heart Disease in his sister; No Known Problems in his brother and mother.     Review of

## 2023-05-12 NOTE — PROGRESS NOTES
Care Transitions Initial Follow Up Call    Outreach made within 2 business days of discharge: No    Patient: Yoly Fish Patient : 1995   MRN: [id number]  Reason for Admission: There are no discharge diagnoses documented for the most recent discharge. Discharge Date: 23       Spoke with: patient    Discharge department/facility: [facility]    TCM Interactive Patient Contact:  Was patient able to fill all prescriptions: Yes  Was patient instructed to bring all medications to the follow-up visit: Yes  Is patient taking all medications as directed in the discharge summary?  Yes  Does patient understand their discharge instructions: Yes  Does patient have questions or concerns that need addressed prior to 7-14 day follow up office visit:no    Scheduled appointment with PCP within 7-14 days    Follow Up  Future Appointments   Date Time Provider Ginger Cr   2023  8:00 AM Gail Lam MD [address] [place], [place]

## 2023-05-12 NOTE — PROGRESS NOTES
[address] [place] [address] [address]  Dept: [phone]  Loc: [phone]      Please see Resident note for complete HPI. ROS per Resident    Lab Results   Component Value Date    WBC 8.0 05/06/2023    HGB 14.4 05/06/2023    HCT 41.6 (L) 05/06/2023    MCV 89.3 05/06/2023     05/06/2023     Lab Results   Component Value Date     05/06/2023    K 3.7 05/06/2023     05/06/2023    CO2 29 05/06/2023    BUN 7 05/06/2023    CREATININE 0.8 05/06/2023    GLUCOSE 88 05/06/2023    CALCIUM 8.9 05/06/2023    PROT 6.1 05/04/2023    LABALBU 4.5 05/04/2023    BILITOT 0.5 05/04/2023    ALKPHOS 65 05/04/2023    AST 34 05/04/2023    ALT 20 05/04/2023    LABGLOM >60 05/06/2023    AGRATIO 2.3 06/29/2016     Lab Results   Component Value Date    LABA1C 5.1 05/03/2023     No results found for: EAG  No results found for: LABMICR, AIMG30RYN  Lab Results   Component Value Date    TSH 1.240 05/03/2023     No results found for: CHOL  No results found for: TRIG  No results found for: HDL  No results found for: LDLCHOLESTEROL, LDLCALC  No results found for: LABVLDL, VLDL  No results found for: CHOLHDLRATIO  No results found for: PSA, PSADIA    Health Maintenance Due   Topic Date Due    COVID-19 Vaccine (1) Never done    Varicella vaccine (1 of 2 - 2-dose childhood series) Never done    DTaP/Tdap/Td vaccine (6 - Tdap) 02/15/2007    Depression Screen  Never done    HIV screen  Never done    Hepatitis C screen  Never done         Physical Exam per Resident       ICD-10-CM    1. Impingement of left ulnar nerve  G56.22       2. Seizure ([address]  R56.9               Plan  I participated in the discussion and care of this patient   Full plan per primary resident.

## 2023-05-12 NOTE — PROGRESS NOTES
[address] [facility] [address] [address]  Dept: [phone]  Loc: Tasia Tenorio (:  1995) is a 33 y.o. male,New patient, here for evaluation of the following chief complaint(s):  Follow-Up from Hospital (Wants to establish care also/[facility] f/u for accidental OD , abnormal EKG, clean cath)      ASSESSMENT/PLAN:  1. Hospital discharge follow-up  2. Impingement of left ulnar nerve  3. Seizure [facility])  -     External Referral To Neurology  4. Contusion of scrotum, initial encounter  5. Situational anxiety    Hospital discharge follow-up:  Admission date 5/3/2023  Discharge date: 2023  Diagnosis: syncope/loss of consciousness likely secondary to fentanyl overdose, contaminated marijuana. EKG normal sinus rhythm with incomplete RBBB with ST elevations. Echo with EF 25-30% with global hypokinesis  During catheterization EF estimated 60%. Repeat EKG for discharge showed normal sinus rhythm. Seizure: Chronic, unstable. Discharge from neurology due to lack of follow-up with diagnosis of syncope and seizure-like activity since 2020. EEG in hospital normal.  Referral to neurology for further evaluation and recommendations. Recommend patient not drive or operate equipment until evaluated and cleared by neurology. Left ulnar neuropathy likely impingement from trauma during the event. Scrotal bruising: Likely secondary to cath, patient reassured no concerning findings on testicular/scrotal exam.    Situational anxiety: Secondary to recent hospital event and admission. Continue as needed hydroxyzine. We will follow-up in future visits. Return in about 1 month (around 2023) for Annual physical.    SUBJECTIVE/OBJECTIVE:  HPI  Patient presents to the clinic for hospital discharge follow-up.     Patient reports he was smoking marijuana prior to work when he arrived to work he was found

## 2023-05-12 NOTE — PROGRESS NOTES
Attending Physician Note    I saw and evaluated the patient, performing the key elements of the service. I discussed the findings, assessment and plan with the resident and agree with the resident's findings and plan as documented in the resident's note. GC modifier added. Brief summary:  Hospital follow up after syncope and collapse from fentanyl ingestion (contaminated marijuana). CPR and responded to narcan. Had acute LV dysfunction with initial EF 25-30%. Cardiac catheterization without sig atherosclerosis and showed EF 60%. Has cardiac follow up next month. Hx seizure disorder - has not had neurology follow up. EEG in hospital was normal.  Refer to neurology for further recommendations. Left ulnar neuropathy, likely impingement from trauma during event - rec stretching exercises and expect resolution over the next few weeks. Scrotal bruising, likely due to cath - pt reassured. No concerning findings on exam.  Situational anxiety - cont prn use of hydroxyzine.

## 2023-06-02 ENCOUNTER — OFFICE VISIT (OUTPATIENT)
Dept: CARDIOLOGY CLINIC | Age: 28
End: 2023-06-02
Payer: COMMERCIAL

## 2023-06-02 VITALS
SYSTOLIC BLOOD PRESSURE: 128 MMHG | DIASTOLIC BLOOD PRESSURE: 66 MMHG | HEART RATE: 84 BPM | BODY MASS INDEX: 32.1 KG/M2 | WEIGHT: 188 LBS | HEIGHT: 64 IN

## 2023-06-02 DIAGNOSIS — R55 SYNCOPE, UNSPECIFIED SYNCOPE TYPE: Primary | ICD-10-CM

## 2023-06-02 PROCEDURE — G8427 DOCREV CUR MEDS BY ELIG CLIN: HCPCS | Performed by: INTERNAL MEDICINE

## 2023-06-02 PROCEDURE — G8417 CALC BMI ABV UP PARAM F/U: HCPCS | Performed by: INTERNAL MEDICINE

## 2023-06-02 PROCEDURE — 99214 OFFICE O/P EST MOD 30 MIN: CPT | Performed by: INTERNAL MEDICINE

## 2023-06-02 PROCEDURE — 1111F DSCHRG MED/CURRENT MED MERGE: CPT | Performed by: INTERNAL MEDICINE

## 2023-06-02 PROCEDURE — 1036F TOBACCO NON-USER: CPT | Performed by: INTERNAL MEDICINE

## 2023-06-02 RX ORDER — HYDROXYZINE HYDROCHLORIDE 25 MG/1
25 TABLET, FILM COATED ORAL 3 TIMES DAILY PRN
COMMUNITY

## 2023-06-02 NOTE — PROGRESS NOTES
[address] [facility] [address] [facility] [address]  Dept: [phone]  Dept Fax: [phone]  Loc: [phone]    Visit Date: 6/2/2023    Mr. Birgit Johnson is a 33 y.o. male  who presented for:  Chief Complaint   Patient presents with    Follow-Up from [address] cath        HPI:   32 yo M is here after hospital discharged. He had accidental overdose of fentanyl. He was down for unknown period of time. Initial echo showed EF 25-30%. Subsquent cath showed normal coronaries and normal EF. He had a loop recorder and is here for a follow up. Doing well. Current Outpatient Medications:     Naproxen Sodium (ALEVE PO), Take by mouth as needed, Disp: , Rfl:     hydrOXYzine HCl (ATARAX) 25 MG tablet, Take 1 tablet by mouth 3 times daily as needed for Itching, Disp: , Rfl:     Past Medical History  Jhonatan Gonzalez  has a past medical history of Seizures ([facility] [address] Social History  Jhonatan Gonzalez  reports that he has never smoked. He has never used smokeless tobacco. He reports current drug use. Frequency: 1.00 time per week. Drug: Marijuana Casper Junior). He reports that he does not drink alcohol. Family History  Jhonatan Gonzalez family history includes Heart Attack in his father; Heart Disease in his sister; No Known Problems in his brother and mother. Past Surgical History   Past Surgical History:   Procedure Laterality Date    DIAGNOSTIC CARDIAC CATH LAB PROCEDURE         Subjective:     REVIEW OF SYSTEMS  Constitutional: denies sweats, chills and fever  HENT: denies  congestion, sinus pressure, sneezing and sore throat. Eyes: denies  pain, discharge, redness and itching. Respiratory: denies apnea, cough  Gastrointestinal: denies blood in stool, constipation, diarrhea   Endocrine: denies cold intolerance, heat intolerance, polydipsia. Genitourinary: denies dysuria, enuresis, flank pain and hematuria.    Musculoskeletal: denies arthralgias, joint swelling and

## 2023-06-20 ENCOUNTER — OFFICE VISIT (OUTPATIENT)
Dept: FAMILY MEDICINE CLINIC | Age: 28
End: 2023-06-20
Payer: COMMERCIAL

## 2023-06-20 VITALS
WEIGHT: 190 LBS | TEMPERATURE: 97.9 F | BODY MASS INDEX: 31.65 KG/M2 | SYSTOLIC BLOOD PRESSURE: 128 MMHG | OXYGEN SATURATION: 99 % | HEART RATE: 61 BPM | HEIGHT: 65 IN | DIASTOLIC BLOOD PRESSURE: 78 MMHG

## 2023-06-20 DIAGNOSIS — R56.9 SEIZURE (HCC): ICD-10-CM

## 2023-06-20 DIAGNOSIS — F41.9 ANXIETY: ICD-10-CM

## 2023-06-20 DIAGNOSIS — Z00.00 ANNUAL PHYSICAL EXAM: Primary | ICD-10-CM

## 2023-06-20 PROCEDURE — 99395 PREV VISIT EST AGE 18-39: CPT | Performed by: STUDENT IN AN ORGANIZED HEALTH CARE EDUCATION/TRAINING PROGRAM

## 2023-06-20 RX ORDER — FLUOXETINE 10 MG/1
20 CAPSULE ORAL DAILY
Qty: 30 CAPSULE | Refills: 1 | Status: SHIPPED | OUTPATIENT
Start: 2023-06-20

## 2023-06-20 NOTE — PROGRESS NOTES
[address] [facility] [address] [facility]  Dept: [phone]  Loc: [phone]      Mechelle Moss (:  1995) is a 33 y.o. male,Established patient, here for evaluation of the following chief complaint(s): Annual Exam ( had a seizure - doing well since then. )      ASSESSMENT/PLAN:  1. Annual physical exam  2. Seizure ([address]  3. Anxiety  -     FLUoxetine (PROZAC) 10 MG capsule; Take 2 capsules by mouth daily, Disp-30 capsule, R-1Normal    Annual physical exam  Healthy well-appearing 52-year-old male  Recommend COVID and varicella vaccine  Especially would recommend Tdap given last 1 received at 9years old and wife 8 months pregnant. Patient would like to hold on HIV and hep C screen until next lab draw. History of seizures with recent seizure a couple weeks ago. 6/10/2022, restarted on Keppra from ED at [facility]  Sept 14 neurology appointment at [facility]  No driving until cleared by neurology (typically 6 months from last seizure). Anxiety disorder  Currently utilizing marijuana to cope with anxiety. We will start fluoxetine low-dose with history of seizure disorder. Patient willing to stop marijuana use if controlled with medication. Patient recently hospitalized last month with fentanyl laced marijuana resulting in heart cath. Return in about 4 weeks (around 2023) for Anxiety f/u. SUBJECTIVE/OBJECTIVE:  HPI  Patient presents to the clinic for annual physical.  Reports doing well no specific concerns or complaints. Seizure 6/10 while fishing. Diet: balanced  Mood: happy  Sleep: 7+ hours nightly  Exercise: fishing, walking    Smoking: marijuana, uses to cope with anxiety  Alcohol: 4 beers weeks  Sexually active: wife only    Review of Systems  Constitutional: Negative for chills, diaphoresis and fever. HENT: Negative for congestion and sore throat.     Eyes: Negative for redness and visual

## 2023-07-18 ENCOUNTER — OFFICE VISIT (OUTPATIENT)
Dept: FAMILY MEDICINE CLINIC | Age: 28
End: 2023-07-18
Payer: COMMERCIAL

## 2023-07-18 VITALS
HEART RATE: 71 BPM | DIASTOLIC BLOOD PRESSURE: 70 MMHG | SYSTOLIC BLOOD PRESSURE: 122 MMHG | TEMPERATURE: 97.4 F | OXYGEN SATURATION: 98 % | WEIGHT: 191 LBS | HEIGHT: 65 IN | RESPIRATION RATE: 12 BRPM | BODY MASS INDEX: 31.82 KG/M2

## 2023-07-18 DIAGNOSIS — F41.9 ANXIETY: ICD-10-CM

## 2023-07-18 DIAGNOSIS — R56.9 SEIZURE (HCC): Primary | ICD-10-CM

## 2023-07-18 PROCEDURE — 99213 OFFICE O/P EST LOW 20 MIN: CPT | Performed by: STUDENT IN AN ORGANIZED HEALTH CARE EDUCATION/TRAINING PROGRAM

## 2023-07-18 PROCEDURE — G8427 DOCREV CUR MEDS BY ELIG CLIN: HCPCS | Performed by: STUDENT IN AN ORGANIZED HEALTH CARE EDUCATION/TRAINING PROGRAM

## 2023-07-18 PROCEDURE — G8417 CALC BMI ABV UP PARAM F/U: HCPCS | Performed by: STUDENT IN AN ORGANIZED HEALTH CARE EDUCATION/TRAINING PROGRAM

## 2023-07-18 PROCEDURE — 1036F TOBACCO NON-USER: CPT | Performed by: STUDENT IN AN ORGANIZED HEALTH CARE EDUCATION/TRAINING PROGRAM

## 2023-07-18 RX ORDER — FLUOXETINE 10 MG/1
20 CAPSULE ORAL DAILY
Qty: 30 CAPSULE | Refills: 3 | Status: SHIPPED | OUTPATIENT
Start: 2023-07-18

## 2023-07-18 RX ORDER — LEVETIRACETAM 500 MG/1
500 TABLET ORAL 2 TIMES DAILY
COMMUNITY
Start: 2023-06-10 | End: 2023-07-18 | Stop reason: SDUPTHER

## 2023-07-18 RX ORDER — LEVETIRACETAM 500 MG/1
500 TABLET ORAL 2 TIMES DAILY
Qty: 180 TABLET | Refills: 3 | Status: SHIPPED | OUTPATIENT
Start: 2023-07-18

## 2023-07-18 NOTE — PATIENT INSTRUCTIONS
Thank you   Thank you for trusting us with your healthcare needs. You may receive a survey regarding today's visit. It would help us out if you would take a few moments to provide your feedback. We value your input. Please bring in ALL medications BOTTLES, including inhalers, herbal supplements, over the counter, prescribed & non-prescribed medicine. The office would like actual medication bottles and a list.   Please note our OFFICE POLICIES:   Prior to getting your labs drawn, please check with your insurance company for benefits and eligibility of lab services. Often, insurance companies cover certain tests for preventative visits only. It is patient's responsibility to see what is covered. We are unable to change a diagnosis after the test has been performed. Lab orders will not be re-printed. Please hold onto your original lab orders and take them to your lab to be completed. If you no show your scheduled appointment three times, you will be dismissed from this practice. Reschedules must be completed 24 hours prior to your schedule appointment. If the list below has been completed, PLEASE FAX RECORDS TO OUR OFFICE @ [phone].  Once the records have been received we will update your records at our office:  Health Maintenance Due   Topic Date Due    COVID-19 Vaccine (1) Never done    Varicella vaccine (1 of 2 - 2-dose childhood series) Never done    DTaP/Tdap/Td vaccine (6 - Tdap) 02/15/2007    HIV screen  Never done    Hepatitis C screen  Never done

## 2023-07-26 ENCOUNTER — TELEPHONE (OUTPATIENT)
Dept: CARDIOLOGY CLINIC | Age: 28
End: 2023-07-26

## 2023-07-26 NOTE — TELEPHONE ENCOUNTER
Bedside monitor for loop disconnected since 6/22/23. Summary report due 7/26/23. Call to patient.   Asked to reconnect to wong

## 2023-08-22 ENCOUNTER — OFFICE VISIT (OUTPATIENT)
Dept: FAMILY MEDICINE CLINIC | Age: 28
End: 2023-08-22
Payer: COMMERCIAL

## 2023-08-22 VITALS
HEIGHT: 65 IN | TEMPERATURE: 98.1 F | SYSTOLIC BLOOD PRESSURE: 112 MMHG | BODY MASS INDEX: 31.56 KG/M2 | HEART RATE: 80 BPM | WEIGHT: 189.4 LBS | DIASTOLIC BLOOD PRESSURE: 70 MMHG | RESPIRATION RATE: 12 BRPM

## 2023-08-22 DIAGNOSIS — R56.9 SEIZURE (HCC): Primary | ICD-10-CM

## 2023-08-22 DIAGNOSIS — F41.9 ANXIETY: ICD-10-CM

## 2023-08-22 PROCEDURE — 99214 OFFICE O/P EST MOD 30 MIN: CPT | Performed by: STUDENT IN AN ORGANIZED HEALTH CARE EDUCATION/TRAINING PROGRAM

## 2023-08-22 PROCEDURE — G8417 CALC BMI ABV UP PARAM F/U: HCPCS | Performed by: STUDENT IN AN ORGANIZED HEALTH CARE EDUCATION/TRAINING PROGRAM

## 2023-08-22 PROCEDURE — 1036F TOBACCO NON-USER: CPT | Performed by: STUDENT IN AN ORGANIZED HEALTH CARE EDUCATION/TRAINING PROGRAM

## 2023-08-22 PROCEDURE — G8427 DOCREV CUR MEDS BY ELIG CLIN: HCPCS | Performed by: STUDENT IN AN ORGANIZED HEALTH CARE EDUCATION/TRAINING PROGRAM

## 2023-08-22 RX ORDER — CALCIUM CARBONATE 500 MG/1
1 TABLET, CHEWABLE ORAL DAILY PRN
COMMUNITY

## 2023-08-22 RX ORDER — PROPRANOLOL HCL 60 MG
60 CAPSULE, EXTENDED RELEASE 24HR ORAL DAILY
Qty: 30 CAPSULE | Refills: 0 | Status: CANCELLED | OUTPATIENT
Start: 2023-08-22

## 2023-08-22 RX ORDER — FLUOXETINE HYDROCHLORIDE 20 MG/1
20 CAPSULE ORAL 2 TIMES DAILY
Qty: 90 CAPSULE | Refills: 1 | Status: SHIPPED | OUTPATIENT
Start: 2023-08-22

## 2023-08-22 RX ORDER — PROPRANOLOL HYDROCHLORIDE 10 MG/1
10 TABLET ORAL 3 TIMES DAILY PRN
Qty: 90 TABLET | Refills: 3 | Status: SHIPPED | OUTPATIENT
Start: 2023-08-22

## 2023-08-22 NOTE — PROGRESS NOTES
[address] [facility] [address] [place] [address]  Dept: [phone]  Loc: [phone] (:  1995) is a 33 y.o. male,Established patient, here for evaluation of the following chief complaint(s):  1 Month Follow-Up (Pt presents for 1 mo f/u for seizure)      ASSESSMENT/PLAN:  1. Seizure ([address])  2. Salome Vigil, PhD, Psychology, [place]  -     FLUoxetine (PROZAC) 20 MG capsule; Take 1 capsule by mouth 2 times daily, Disp-90 capsule, R-1Normal  -     propranolol (INDERAL) 10 MG tablet; Take 1 tablet by mouth 3 times daily as needed (Anxiety), Disp-90 tablet, R-3Normal    Chronic seizure disorder: Currently controlled on Keppra 500 twice daily  Last seizure in . Neurology appointment scheduled for  at [facility]    Anxiety: Chronic, uncontrolled. Question if trauma related as patient is showing some signs of treatment resistant anxiety. Increase Prozac to 20 mg twice daily, if no improvement may try Cymbalta. Trial propranolol 10 mg 3 times daily as needed  If having difficulty with sleep may trial Remeron in future. Close follow up in 4 weeks  Refer to psychiatry for counseling, previously saw psychiatry in [place] and was told it was trauma related and medications would not help. Prior TSH within normal limits    Return in about 1 month (around 2023) for anxiey. SUBJECTIVE/OBJECTIVE:  HPI  Patient presents to clinic for follow-up for anxiety. Last seizure in . Neurology  at [facility]    Anxiety not controlled on current dose of fluoxetine,  Feels as though her anxiety is constant with triggers such as long lines in the gas station or around lots of people. Had been on SSRI treatment in the past but does not recall the medication or dose. Previously saw physcology/counseling in [place] and was told it was related to trauma.     Tried hydroxizine before without much

## 2023-08-25 ENCOUNTER — PROCEDURE VISIT (OUTPATIENT)
Dept: CARDIOLOGY CLINIC | Age: 28
End: 2023-08-25

## 2023-08-25 DIAGNOSIS — R55 SYNCOPE, UNSPECIFIED SYNCOPE TYPE: Primary | ICD-10-CM

## 2023-09-27 ENCOUNTER — TELEPHONE (OUTPATIENT)
Dept: CARDIOLOGY CLINIC | Age: 28
End: 2023-09-27

## 2023-09-27 NOTE — TELEPHONE ENCOUNTER
Loop michael disconnected since 8/11/23. Call to patient. Michael is actually on his wifes phone. He will connect to it tonight. Spoke to patient about ordering a home monitor instead. Will do that once we get a current download.

## 2023-10-02 ENCOUNTER — TELEPHONE (OUTPATIENT)
Dept: CARDIOLOGY CLINIC | Age: 28
End: 2023-10-02

## 2023-10-18 ENCOUNTER — TELEPHONE (OUTPATIENT)
Dept: CARDIOLOGY CLINIC | Age: 28
End: 2023-10-18

## 2023-10-18 NOTE — TELEPHONE ENCOUNTER
Loop remains disconnected since 8/10/23. Loop monitor ordered 10/3/23  Call to patient. They received monitor. Will pair monitor and loop now.

## 2023-10-31 ENCOUNTER — TELEPHONE (OUTPATIENT)
Dept: CARDIOLOGY CLINIC | Age: 28
End: 2023-10-31

## 2023-10-31 NOTE — TELEPHONE ENCOUNTER
Missed download from device. Call to patient. Aware we still have not received a download. He will call MDT to get device and monitor paired. Asked pt to call office and let us know what is going on.

## 2023-11-22 ENCOUNTER — TELEPHONE (OUTPATIENT)
Dept: CARDIOLOGY CLINIC | Age: 28
End: 2023-11-22

## 2023-12-15 ENCOUNTER — TELEPHONE (OUTPATIENT)
Dept: CARDIOLOGY CLINIC | Age: 28
End: 2023-12-15

## 2023-12-29 ENCOUNTER — TELEPHONE (OUTPATIENT)
Dept: CARDIOLOGY CLINIC | Age: 28
End: 2023-12-29

## 2024-02-08 ENCOUNTER — OFFICE VISIT (OUTPATIENT)
Dept: FAMILY MEDICINE CLINIC | Age: 29
End: 2024-02-08
Payer: COMMERCIAL

## 2024-02-08 VITALS
WEIGHT: 191.4 LBS | OXYGEN SATURATION: 99 % | HEART RATE: 60 BPM | DIASTOLIC BLOOD PRESSURE: 70 MMHG | SYSTOLIC BLOOD PRESSURE: 110 MMHG | TEMPERATURE: 98.2 F | RESPIRATION RATE: 21 BRPM | BODY MASS INDEX: 31.89 KG/M2 | HEIGHT: 65 IN

## 2024-02-08 DIAGNOSIS — R56.9 SEIZURE (HCC): Primary | ICD-10-CM

## 2024-02-08 DIAGNOSIS — H61.23 BILATERAL IMPACTED CERUMEN: ICD-10-CM

## 2024-02-08 DIAGNOSIS — G40.901 NONINTRACTABLE EPILEPSY WITH STATUS EPILEPTICUS, UNSPECIFIED EPILEPSY TYPE (HCC): ICD-10-CM

## 2024-02-08 PROCEDURE — 99214 OFFICE O/P EST MOD 30 MIN: CPT | Performed by: STUDENT IN AN ORGANIZED HEALTH CARE EDUCATION/TRAINING PROGRAM

## 2024-02-08 RX ORDER — LEVETIRACETAM 500 MG/1
500 TABLET ORAL 2 TIMES DAILY
Qty: 180 TABLET | Refills: 3 | Status: SHIPPED | OUTPATIENT
Start: 2024-02-08

## 2024-02-08 RX ORDER — FLUTICASONE PROPIONATE 50 MCG
2 SPRAY, SUSPENSION (ML) NASAL DAILY
Qty: 16 G | Refills: 0 | Status: CANCELLED | OUTPATIENT
Start: 2024-02-08

## 2024-02-08 NOTE — PROGRESS NOTES
[facility]  [address]  [place] [id number]  Dept: [phone]  Loc: [phone]      Tito Horne (:  1995) is a 28 y.o. male,Established patient, here for evaluation of the following chief complaint(s):  Other (Ear fullness, first noticed about a year ago.)      ASSESSMENT/PLAN:  1. Seizure (HCC)  -     levETIRAcetam (KEPPRA) 500 MG tablet; Take 1 tablet by mouth 2 times daily, Disp-180 tablet, R-3Normal  2. Nonintractable epilepsy with status epilepticus, unspecified epilepsy type (HCC)  3. Bilateral impacted cerumen    Chronic seizure disorder: Currently controlled on Keppra 500 twice daily  Last seizure in 2023  Follows with Dr. Corey, neurology at [facility] was told he had epilepsy and to continue with family medicine for Keppra and to return if seizures return.    Bilateral cerumen impaction with resolution of decreased hearing and ringing of the ear after irrigation.  Patient tolerated procedure well with no complications.  Continue with ear protection when shooting.    No congestion, vision changes, dizziness, fevers.     Anxiety well-controlled off medication, patient self tapered  Continue off fluoxetine 20 mg and propranolol 10 mg    Return in about 6 months (around 2024) for well adult exam.    SUBJECTIVE/OBJECTIVE:  HPI  Patient to the clinic for acute visit and fullness of the ear first noted roughly 1 year ago.  Alternates which ear is worse. Ringing in right ear for 1 year.   Shoots guns indoor range, used to use ear plugs and transitioned to only using over ear muffs. 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  Was not good at ear protection as kid was shooting without protection and loud music.     Neurology: saw [facility] Dr. Corey who would like  to continue with keppra and return if seizure returns.     Anxiety improved off of prozac propranolol.

## 2025-02-06 ENCOUNTER — APPOINTMENT (OUTPATIENT)
Dept: CT IMAGING | Age: 30
End: 2025-02-06

## 2025-02-06 ENCOUNTER — HOSPITAL ENCOUNTER (EMERGENCY)
Age: 30
Discharge: HOME | End: 2025-02-06
Attending: EMERGENCY MEDICINE

## 2025-02-06 VITALS
HEIGHT: 65 IN | WEIGHT: 180 LBS | SYSTOLIC BLOOD PRESSURE: 111 MMHG | TEMPERATURE: 97.8 F | OXYGEN SATURATION: 100 % | DIASTOLIC BLOOD PRESSURE: 52 MMHG | RESPIRATION RATE: 13 BRPM | BODY MASS INDEX: 29.99 KG/M2 | HEART RATE: 73 BPM

## 2025-02-06 DIAGNOSIS — R56.9 SEIZURE (HCC): Primary | ICD-10-CM

## 2025-02-06 DIAGNOSIS — Z91.148 NONCOMPLIANCE WITH MEDICATION REGIMEN: ICD-10-CM

## 2025-02-06 LAB
ALBUMIN SERPL BCG-MCNC: 4.8 G/DL (ref 3.5–5.1)
ALP SERPL-CCNC: 82 U/L (ref 38–126)
ALT SERPL W/O P-5'-P-CCNC: 32 U/L (ref 11–66)
ANION GAP SERPL CALC-SCNC: 23 MEQ/L (ref 8–16)
AST SERPL-CCNC: 25 U/L (ref 5–40)
BASOPHILS ABSOLUTE: 0 THOU/MM3 (ref 0–0.1)
BASOPHILS NFR BLD AUTO: 0.2 %
BILIRUB SERPL-MCNC: 0.7 MG/DL (ref 0.3–1.2)
BUN SERPL-MCNC: 12 MG/DL (ref 7–22)
CALCIUM SERPL-MCNC: 9.5 MG/DL (ref 8.5–10.5)
CHLORIDE SERPL-SCNC: 101 MEQ/L (ref 98–111)
CO2 SERPL-SCNC: 18 MEQ/L (ref 23–33)
CREAT SERPL-MCNC: 0.8 MG/DL (ref 0.4–1.2)
DEPRECATED RDW RBC AUTO: 40.6 FL (ref 35–45)
EKG ATRIAL RATE: 99 BPM
EKG P AXIS: 61 DEGREES
EKG P-R INTERVAL: 134 MS
EKG Q-T INTERVAL: 334 MS
EKG QRS DURATION: 76 MS
EKG QTC CALCULATION (BAZETT): 428 MS
EKG R AXIS: 55 DEGREES
EKG T AXIS: 55 DEGREES
EKG VENTRICULAR RATE: 99 BPM
EOSINOPHIL NFR BLD AUTO: 1.6 %
EOSINOPHILS ABSOLUTE: 0.1 THOU/MM3 (ref 0–0.4)
ERYTHROCYTE [DISTWIDTH] IN BLOOD BY AUTOMATED COUNT: 13 % (ref 11.5–14.5)
GFR SERPL CREATININE-BSD FRML MDRD: > 90 ML/MIN/1.73M2
GLUCOSE BLD STRIP.AUTO-MCNC: 132 MG/DL (ref 70–108)
GLUCOSE SERPL-MCNC: 127 MG/DL (ref 70–108)
HCT VFR BLD AUTO: 49.9 % (ref 42–52)
HGB BLD-MCNC: 17.8 GM/DL (ref 14–18)
IMM GRANULOCYTES # BLD AUTO: 0.05 THOU/MM3 (ref 0–0.07)
IMM GRANULOCYTES NFR BLD AUTO: 0.5 %
KEPPRA: < 2 UG/ML
LYMPHOCYTES ABSOLUTE: 2.1 THOU/MM3 (ref 1–4.8)
LYMPHOCYTES NFR BLD AUTO: 22.8 %
MAGNESIUM SERPL-MCNC: 2.4 MG/DL (ref 1.6–2.4)
MCH RBC QN AUTO: 31.2 PG (ref 26–33)
MCHC RBC AUTO-ENTMCNC: 35.7 GM/DL (ref 32.2–35.5)
MCV RBC AUTO: 87.4 FL (ref 80–94)
MONOCYTES ABSOLUTE: 0.6 THOU/MM3 (ref 0.4–1.3)
MONOCYTES NFR BLD AUTO: 6.2 %
NEUTROPHILS ABSOLUTE: 6.4 THOU/MM3 (ref 1.8–7.7)
NEUTROPHILS NFR BLD AUTO: 68.7 %
NRBC BLD AUTO-RTO: 0 /100 WBC
OSMOLALITY SERPL CALC.SUM OF ELEC: 284.5 MOSMOL/KG (ref 275–300)
PLATELET # BLD AUTO: 223 THOU/MM3 (ref 130–400)
PMV BLD AUTO: 8.6 FL (ref 9.4–12.4)
POTASSIUM SERPL-SCNC: 4.2 MEQ/L (ref 3.5–5.2)
PROT SERPL-MCNC: 7.5 G/DL (ref 6.1–8)
RBC # BLD AUTO: 5.71 MILL/MM3 (ref 4.7–6.1)
SODIUM SERPL-SCNC: 142 MEQ/L (ref 135–145)
WBC # BLD AUTO: 9.3 THOU/MM3 (ref 4.8–10.8)

## 2025-02-06 PROCEDURE — 80177 DRUG SCRN QUAN LEVETIRACETAM: CPT

## 2025-02-06 PROCEDURE — 93010 ELECTROCARDIOGRAM REPORT: CPT | Performed by: NUCLEAR MEDICINE

## 2025-02-06 PROCEDURE — 6360000002 HC RX W HCPCS: Performed by: EMERGENCY MEDICINE

## 2025-02-06 PROCEDURE — 96374 THER/PROPH/DIAG INJ IV PUSH: CPT

## 2025-02-06 PROCEDURE — 93005 ELECTROCARDIOGRAM TRACING: CPT | Performed by: EMERGENCY MEDICINE

## 2025-02-06 PROCEDURE — 70450 CT HEAD/BRAIN W/O DYE: CPT

## 2025-02-06 PROCEDURE — 85025 COMPLETE CBC W/AUTO DIFF WBC: CPT

## 2025-02-06 PROCEDURE — 36415 COLL VENOUS BLD VENIPUNCTURE: CPT

## 2025-02-06 PROCEDURE — 83735 ASSAY OF MAGNESIUM: CPT

## 2025-02-06 PROCEDURE — 82948 REAGENT STRIP/BLOOD GLUCOSE: CPT

## 2025-02-06 PROCEDURE — 80053 COMPREHEN METABOLIC PANEL: CPT

## 2025-02-06 PROCEDURE — 99284 EMERGENCY DEPT VISIT MOD MDM: CPT

## 2025-02-06 RX ORDER — LEVETIRACETAM 500 MG/5ML
1000 INJECTION, SOLUTION, CONCENTRATE INTRAVENOUS ONCE
Status: COMPLETED | OUTPATIENT
Start: 2025-02-06 | End: 2025-02-06

## 2025-02-06 RX ADMIN — LEVETIRACETAM 1000 MG: 100 INJECTION INTRAVENOUS at 09:20

## 2025-02-06 ASSESSMENT — PAIN - FUNCTIONAL ASSESSMENT
PAIN_FUNCTIONAL_ASSESSMENT: NONE - DENIES PAIN

## 2025-02-06 NOTE — ED NOTES
Pt is in bed and resting. Discussed plan of care and pt has no questions or concerns at this time. Respirations are even and unlabored.

## 2025-02-06 NOTE — ED PROVIDER NOTES
[facility]  EMERGENCY DEPARTMENT ENCOUNTER          Pt Name: Tito Horne  MRN: [id number]  Birthdate 1995  Date of evaluation: 2/6/2025    CHIEF COMPLAINT       Chief Complaint   Patient presents with    Seizures       Nurses Notes reviewed and I agree except as noted in the HPI.    HISTORY OF PRESENT ILLNESS    Tito Horne is a 29 y.o. pleasant male who presents to the emergency department for evaluation of seizure.  Patient states that he was at work today.  He remembers getting up and heading to work.  However he does not remember events at work today.  He made it to his workplace, states that he felt well, he does not experience any aura prior to seizures.  He has no warning before they happen.  Per coworkers was at work, from EMS report, they heard him fall, he had a seizure which lasted about 30 seconds and was postictal for few minutes afterward.  Patient reports that he does not take his Keppra as prescribed.  He often skips a full day and nighttime doses because it is cheaper.  He thinks he took Keppra on Monday and maybe took 1 dose on Wednesday.  He believes that in 2024 he had more than 4 seizures but is uncertain of the total number in the past year.  He denies headache, neck pain.  Denies chest pain or abdominal pain.  Denies any injury other than biting his tongue.  Denies fever, chills, sore throat, cough, shortness of breath, urinary complaints. He does not drive to work but takes public transport.  The patient has no other acute complaints at this time.        REVIEW OF SYSTEMS   Review of Systems    PAST MEDICAL AND SURGICAL HISTORY     Past Medical History:   Diagnosis Date    Seizures (HCC)      Past Surgical History:   Procedure Laterality Date    DIAGNOSTIC CARDIAC CATH LAB PROCEDURE         CURRENT MEDICATIONS   No current facility-administered medications for this encounter.    Current Outpatient Medications:     levETIRAcetam (KEPPRA) 500 MG

## 2025-02-06 NOTE — DISCHARGE INSTRUCTIONS
Please be sure to take your home medications as directed. Please call the Family Medicine clinic in order to be seen for a follow up appointment in 1-2 days. Please refrain from driving, operating heavy machinery, being on a ladder, swimming, or other activities that would be dangerous if you had another seizure.

## 2025-02-06 NOTE — ED NOTES
Pt arrives to the Ed following a seizure via [place]. Pt was at work when his co workers heard him fell. Co-workers stated they witnessed the pt in a seizure for about 30 seconds. Pt was post ictal for about 2-3 minutes. Pt on arrival of EMS was alert and oriented. Pt denies any pain including chest pain. Denies sob, fever or cough. Pt states he has a hx of seizures and takes keppra at home however is not consistent. Pt respirations are unlabored. VSS

## 2025-02-06 NOTE — ED NOTES
Pt. Resting in bed with even and unlabored respirations. Pt. Denies any pain. medicated per mar.  Pt. Updated about plan of care and treatment.  Pt. Has no further concerns, questions or needs at this time. Call light within reach.

## 2025-03-04 DIAGNOSIS — R56.9 SEIZURE (HCC): ICD-10-CM

## 2025-03-04 RX ORDER — LEVETIRACETAM 500 MG/1
500 TABLET ORAL 2 TIMES DAILY
Qty: 60 TABLET | Refills: 0 | Status: SHIPPED | OUTPATIENT
Start: 2025-03-04

## 2025-03-04 NOTE — TELEPHONE ENCOUNTER
Fax refill request received from Derek Alvarado. Patient last seen in office 02/08/24 by Dr Infante for acute care visit.

## 2025-06-05 ENCOUNTER — OFFICE VISIT (OUTPATIENT)
Dept: FAMILY MEDICINE CLINIC | Age: 30
End: 2025-06-05

## 2025-06-05 VITALS
SYSTOLIC BLOOD PRESSURE: 116 MMHG | RESPIRATION RATE: 16 BRPM | TEMPERATURE: 97.9 F | BODY MASS INDEX: 34.95 KG/M2 | OXYGEN SATURATION: 97 % | DIASTOLIC BLOOD PRESSURE: 82 MMHG | HEIGHT: 65 IN | HEART RATE: 79 BPM | WEIGHT: 209.8 LBS

## 2025-06-05 DIAGNOSIS — R56.9 SEIZURE (HCC): ICD-10-CM

## 2025-06-05 PROCEDURE — 99213 OFFICE O/P EST LOW 20 MIN: CPT

## 2025-06-05 RX ORDER — LEVETIRACETAM 500 MG/1
500 TABLET ORAL 2 TIMES DAILY
Qty: 60 TABLET | Refills: 0 | Status: SHIPPED | OUTPATIENT
Start: 2025-06-05

## 2025-06-05 SDOH — ECONOMIC STABILITY: FOOD INSECURITY: WITHIN THE PAST 12 MONTHS, THE FOOD YOU BOUGHT JUST DIDN'T LAST AND YOU DIDN'T HAVE MONEY TO GET MORE.: PATIENT DECLINED

## 2025-06-05 SDOH — ECONOMIC STABILITY: FOOD INSECURITY: WITHIN THE PAST 12 MONTHS, YOU WORRIED THAT YOUR FOOD WOULD RUN OUT BEFORE YOU GOT MONEY TO BUY MORE.: PATIENT DECLINED

## 2025-06-05 ASSESSMENT — ENCOUNTER SYMPTOMS
SHORTNESS OF BREATH: 0
ABDOMINAL PAIN: 0
VOMITING: 0
CHEST TIGHTNESS: 0
DIARRHEA: 0
COUGH: 0
NAUSEA: 0
CONSTIPATION: 0

## 2025-06-05 ASSESSMENT — PATIENT HEALTH QUESTIONNAIRE - PHQ9
SUM OF ALL RESPONSES TO PHQ QUESTIONS 1-9: 0
SUM OF ALL RESPONSES TO PHQ QUESTIONS 1-9: 0
2. FEELING DOWN, DEPRESSED OR HOPELESS: NOT AT ALL
SUM OF ALL RESPONSES TO PHQ QUESTIONS 1-9: 0
SUM OF ALL RESPONSES TO PHQ QUESTIONS 1-9: 0
1. LITTLE INTEREST OR PLEASURE IN DOING THINGS: NOT AT ALL

## 2025-06-05 NOTE — PROGRESS NOTES
Patient:Tito Horne  YOB: 1995   MRN:[id number]    Subjective   30 y.o. male who presents for the following: Medication Refill (In office today for medication refills. Not seen in office since 02/2024.)    Patient is a 30-year-old male who presents today for medication refill.  Patient states that his last breakthrough seizure was in February.  This is when he ran out of medications.  Otherwise has not had any other breakthrough seizures  As he takes the medication.  He is needing a refill at this time.      Review of Systems   Constitutional:  Negative for activity change, appetite change, chills, diaphoresis and fatigue.   Respiratory:  Negative for cough, chest tightness and shortness of breath.    Cardiovascular:  Negative for chest pain.   Gastrointestinal:  Negative for abdominal pain, constipation, diarrhea, nausea and vomiting.     PMHx: He has a past medical history of Seizures ([facility]).    Objective     Vitals:    06/05/25 1627   BP: 116/82   BP Site: Right Upper Arm   Patient Position: Sitting   BP Cuff Size: Medium Adult   Pulse: 79   Resp: 16   Temp: 97.9 °F (36.6 °C)   TempSrc: Oral   SpO2: 97%   Weight: 95.2 kg (209 lb 12.8 oz)   Height: 1.651 m (5' 5\")   Body mass index is 34.91 kg/m².    Physical Exam  Constitutional:       General: He is not in acute distress.     Appearance: Normal appearance. He is obese. He is not ill-appearing, toxic-appearing or diaphoretic.   HENT:      Head: Normocephalic and atraumatic.      Right Ear: External ear normal. There is no impacted cerumen.      Left Ear: External ear normal. There is no impacted cerumen.      Nose: Nose normal. No congestion.      Mouth/Throat:      Mouth: Mucous membranes are moist.   Eyes:      Extraocular Movements: Extraocular movements intact.   Cardiovascular:      Rate and Rhythm: Normal rate and regular rhythm.      Pulses: Normal pulses.      Heart sounds: Normal heart sounds. No murmur heard.     No friction rub. No

## 2025-07-28 DIAGNOSIS — R56.9 SEIZURE (HCC): ICD-10-CM

## 2025-07-28 RX ORDER — LEVETIRACETAM 500 MG/1
500 TABLET ORAL 2 TIMES DAILY
Qty: 60 TABLET | Refills: 0 | Status: SHIPPED | OUTPATIENT
Start: 2025-07-28 | End: 2025-07-29 | Stop reason: ALTCHOICE

## 2025-07-29 RX ORDER — LEVETIRACETAM 500 MG/1
500 TABLET ORAL 2 TIMES DAILY
Qty: 180 TABLET | Refills: 0 | Status: SHIPPED | OUTPATIENT
Start: 2025-07-29